# Patient Record
Sex: MALE | Race: WHITE | NOT HISPANIC OR LATINO | Employment: FULL TIME | URBAN - METROPOLITAN AREA
[De-identification: names, ages, dates, MRNs, and addresses within clinical notes are randomized per-mention and may not be internally consistent; named-entity substitution may affect disease eponyms.]

---

## 2017-01-09 ENCOUNTER — GENERIC CONVERSION - ENCOUNTER (OUTPATIENT)
Dept: OTHER | Facility: OTHER | Age: 47
End: 2017-01-09

## 2017-02-10 ENCOUNTER — GENERIC CONVERSION - ENCOUNTER (OUTPATIENT)
Dept: OTHER | Facility: OTHER | Age: 47
End: 2017-02-10

## 2017-03-01 ENCOUNTER — ALLSCRIPTS OFFICE VISIT (OUTPATIENT)
Dept: OTHER | Facility: OTHER | Age: 47
End: 2017-03-01

## 2017-07-06 ENCOUNTER — ALLSCRIPTS OFFICE VISIT (OUTPATIENT)
Dept: OTHER | Facility: OTHER | Age: 47
End: 2017-07-06

## 2018-01-13 VITALS
WEIGHT: 186 LBS | TEMPERATURE: 98 F | RESPIRATION RATE: 16 BRPM | HEIGHT: 69 IN | HEART RATE: 72 BPM | DIASTOLIC BLOOD PRESSURE: 80 MMHG | BODY MASS INDEX: 27.55 KG/M2 | SYSTOLIC BLOOD PRESSURE: 120 MMHG

## 2018-01-14 VITALS
BODY MASS INDEX: 28.44 KG/M2 | HEIGHT: 69 IN | HEART RATE: 74 BPM | SYSTOLIC BLOOD PRESSURE: 110 MMHG | TEMPERATURE: 98.4 F | WEIGHT: 192 LBS | DIASTOLIC BLOOD PRESSURE: 70 MMHG | RESPIRATION RATE: 16 BRPM

## 2018-03-09 ENCOUNTER — OFFICE VISIT (OUTPATIENT)
Dept: FAMILY MEDICINE CLINIC | Facility: CLINIC | Age: 48
End: 2018-03-09
Payer: COMMERCIAL

## 2018-03-09 VITALS
HEART RATE: 72 BPM | HEIGHT: 69 IN | RESPIRATION RATE: 16 BRPM | BODY MASS INDEX: 28.44 KG/M2 | WEIGHT: 192 LBS | SYSTOLIC BLOOD PRESSURE: 125 MMHG | TEMPERATURE: 98.2 F | DIASTOLIC BLOOD PRESSURE: 80 MMHG

## 2018-03-09 DIAGNOSIS — R20.8 RECTAL BURNING: Primary | ICD-10-CM

## 2018-03-09 DIAGNOSIS — Z00.00 PHYSICAL EXAM: ICD-10-CM

## 2018-03-09 DIAGNOSIS — K62.5 BRBPR (BRIGHT RED BLOOD PER RECTUM): ICD-10-CM

## 2018-03-09 PROCEDURE — 99214 OFFICE O/P EST MOD 30 MIN: CPT | Performed by: FAMILY MEDICINE

## 2018-03-09 NOTE — PROGRESS NOTES
Chief Complaint   Patient presents with    GI Problem     x several weeks        Patient ID: Capo Bullock is a 52 y o  male  HPI  Pt is seeing for "rectal burning pain with defecation" x few wks -  Had similar problems only occasionally for many years  -  Never had colonoscopy -  Noticed red blood when wiping himself x 2 over last wk, no prior h/o constipation     The following portions of the patient's history were reviewed and updated as appropriate: allergies, current medications, past family history, past medical history, past social history, past surgical history and problem list     Review of Systems   Constitutional: Negative  Respiratory: Negative  Cardiovascular: Negative  Gastrointestinal: Positive for rectal pain  Negative for abdominal distention, abdominal pain, blood in stool, constipation, diarrhea, nausea and vomiting  Genitourinary: Negative  Musculoskeletal: Negative  Skin: Negative  Neurological: Negative  No current outpatient prescriptions on file  No current facility-administered medications for this visit  Objective:    /80 (BP Location: Left arm, Patient Position: Sitting, Cuff Size: Standard)   Pulse 72   Temp 98 2 °F (36 8 °C) (Tympanic)   Resp 16   Ht 5' 9" (1 753 m)   Wt 87 1 kg (192 lb)   BMI 28 35 kg/m²        Physical Exam   Constitutional: He is oriented to person, place, and time  He appears well-nourished  No distress  Cardiovascular: Normal rate and regular rhythm  No murmur heard  Abdominal: Soft  There is no tenderness  Neurological: He is oriented to person, place, and time  Assessment/Plan:         Diagnoses and all orders for this visit:    Rectal burning  -     Ambulatory referral to Gastroenterology; Future    BRBPR (bright red blood per rectum)  -     Ambulatory referral to Gastroenterology; Future  -     CBC; Future  -     Comprehensive metabolic panel;  Future  -     CBC  -     Comprehensive metabolic panel    Physical exam  -     Lipid panel;  Future  -     Lipid panel        rto prn                     Elisabeth Lopez MD

## 2018-03-21 ENCOUNTER — OFFICE VISIT (OUTPATIENT)
Dept: GASTROENTEROLOGY | Facility: CLINIC | Age: 48
End: 2018-03-21
Payer: COMMERCIAL

## 2018-03-21 VITALS
HEIGHT: 69 IN | WEIGHT: 182 LBS | RESPIRATION RATE: 14 BRPM | DIASTOLIC BLOOD PRESSURE: 72 MMHG | HEART RATE: 73 BPM | BODY MASS INDEX: 26.96 KG/M2 | SYSTOLIC BLOOD PRESSURE: 118 MMHG

## 2018-03-21 DIAGNOSIS — R19.8 CHANGE IN BOWEL MOVEMENT: ICD-10-CM

## 2018-03-21 DIAGNOSIS — R20.8 RECTAL BURNING: ICD-10-CM

## 2018-03-21 DIAGNOSIS — K62.5 BRBPR (BRIGHT RED BLOOD PER RECTUM): Primary | ICD-10-CM

## 2018-03-21 PROCEDURE — 99244 OFF/OP CNSLTJ NEW/EST MOD 40: CPT | Performed by: INTERNAL MEDICINE

## 2018-03-21 NOTE — PROGRESS NOTES
Consultation - [facility]  Miko Hatfield 52 y o  male MRN: [id number]          Assessment & Plan:    Pleasant 80-year-old gentleman with recent change in bowel movements and rectal bleeding  1   Recent change in bowel movements and rectal bleeding:  Most likely constipation mediated, sounds like bleeding is likely hemorrhoidal   Of course we need to exclude underlying inflammatory bowel disease and malignancies and polyps  -we will prescribe topical hydrocortisone cream for hemorrhoidal treatment  -recommended high-fiber diet increasing fluid intake  -we will proceed with colonoscopy to exclude luminal process is  -discussed with the patient the risks of the procedures including bleeding, surgery, perforation, missed polyp detection rate      _____________________________________________________________        CC:   Rectal bleedin and change in bowel movements    HPI:  Miko Hatfield is a 52 y o male who was referred for evaluation of rectal bleeding change in bowel movements  As you know this is a very pleasant and healthy 80-year-old gentleman who is had some orthopedic surgeries in the past, normal reports having very regular bowel movements have a over the past 1 month he has had some change in bowel movements with increasing constipation  He has had some hard stools with some straining with lower abdominal bloating  He notes that on several occasions he had some bright red blood per rectum, initially just around the stool but then a large amount of red blood per rectum in the toilet water  In the past he has had some perianal itching and burning with some small amount of red blood white wiping after bowel movements  He denies any family history of GI or associated malignancies  Denies any nausea, vomiting, dysphagia, heartburn symptoms  His weight has been stable  Review of systems he does note some frequent headaches    Also notable having some night sweats which is more recent over the past 1 month  Patient denies any recent travel, change in medications, change in diet or activity  He denies any tobacco, he works for an [employer]  ROS:  The remainder of the ROS was negative except for the pertinent positives mentioned in HPI  Allergies: Nabumetone    Medications:   Current Outpatient Prescriptions:     hydrocortisone-pramoxine (ANALPRAM-HC) 2 5-1 % rectal cream, Insert into the rectum 3 (three) times a day, Disp: 30 g, Rfl: 0    Na Sulfate-K Sulfate-Mg Sulf (SUPREP BOWEL PREP KIT) 17 5-3 13-1 6 GM/180ML SOLN, Take 1 Bottle by mouth once for 1 dose, Disp: 1 Bottle, Rfl: 0'    No past medical history on file  Past Surgical History:   Procedure Laterality Date    APPENDECTOMY      HYDROCELE EXCISION / REPAIR         Family History   Problem Relation Age of Onset    Breast cancer Mother     Diabetes Father     Hypertension Father         reports that he has never smoked  He has never used smokeless tobacco           Physical Exam:     There were no vitals taken for this visit      Gen: wn/wd, NAD  HEENT: anicteric, MMM, no cervical LAD  CVS: RRR, no m/r/g  CHEST: CTA b/l  ABD: +BS, soft, NT,ND, no hepatosplenomegaly, no abdominal masses or tenderness on exam  EXT: no c/c/e  NEURO: aaox3  SKIN: NO rashes

## 2018-03-21 NOTE — LETTER
March 21, 2018     Minor [place], [address] [facility]    Patient: April Walters   YOB: 1970   Date of Visit: 3/21/2018       Dear Dr Ginger Johnson: Thank you for referring Iola Cockayne to me for evaluation  Below are my notes for this consultation  If you have questions, please do not hesitate to call me  I look forward to following your patient along with you  Sincerely,        Elmira Salcido MD        CC: No Recipients  Elmira Salcido MD  3/21/2018  8:32 AM  Sign at close encounter  Consultation - [address] [facility]  April Walters 52 y o  male MRN: [id number]          Assessment & Plan:    Pleasant 51-year-old gentleman with recent change in bowel movements and rectal bleeding  1   Recent change in bowel movements and rectal bleeding:  Most likely constipation mediated, sounds like bleeding is likely hemorrhoidal   Of course we need to exclude underlying inflammatory bowel disease and malignancies and polyps  -we will prescribe topical hydrocortisone cream for hemorrhoidal treatment  -recommended high-fiber diet increasing fluid intake  -we will proceed with colonoscopy to exclude luminal process is  -discussed with the patient the risks of the procedures including bleeding, surgery, perforation, missed polyp detection rate      _____________________________________________________________        CC:   Rectal bleedin and change in bowel movements    HPI:  April Walters is a 52 y o male who was referred for evaluation of rectal bleeding change in bowel movements  As you know this is a very pleasant and healthy 51-year-old gentleman who is had some orthopedic surgeries in the past, normal reports having very regular bowel movements have a over the past 1 month he has had some change in bowel movements with increasing constipation  He has had some hard stools with some straining with lower abdominal bloating    He notes that on several occasions he had some bright red blood per rectum, initially just around the stool but then a large amount of red blood per rectum in the toilet water  In the past he has had some perianal itching and burning with some small amount of red blood white wiping after bowel movements  He denies any family history of GI or associated malignancies  Denies any nausea, vomiting, dysphagia, heartburn symptoms  His weight has been stable  Review of systems he does note some frequent headaches  Also notable having some night sweats which is more recent over the past 1 month  Patient denies any recent travel, change in medications, change in diet or activity  He denies any tobacco, he works for an [employer]  ROS:  The remainder of the ROS was negative except for the pertinent positives mentioned in HPI  Allergies: Nabumetone    Medications:   Current Outpatient Prescriptions:     hydrocortisone-pramoxine (ANALPRAM-HC) 2 5-1 % rectal cream, Insert into the rectum 3 (three) times a day, Disp: 30 g, Rfl: 0    Na Sulfate-K Sulfate-Mg Sulf (SUPREP BOWEL PREP KIT) 17 5-3 13-1 6 GM/180ML SOLN, Take 1 Bottle by mouth once for 1 dose, Disp: 1 Bottle, Rfl: 0'    No past medical history on file  Past Surgical History:   Procedure Laterality Date    APPENDECTOMY      HYDROCELE EXCISION / REPAIR         Family History   Problem Relation Age of Onset    Breast cancer Mother     Diabetes Father     Hypertension Father         reports that he has never smoked  He has never used smokeless tobacco           Physical Exam:     There were no vitals taken for this visit      Gen: wn/wd, NAD  HEENT: anicteric, MMM, no cervical LAD  CVS: RRR, no m/r/g  CHEST: CTA b/l  ABD: +BS, soft, NT,ND, no hepatosplenomegaly, no abdominal masses or tenderness on exam  EXT: no c/c/e  NEURO: aaox3  SKIN: NO rashes

## 2018-03-30 LAB
ALBUMIN SERPL-MCNC: 4.2 G/DL (ref 3.6–5.1)
ALBUMIN/GLOB SERPL: 2 (CALC) (ref 1–2.5)
ALP SERPL-CCNC: 68 U/L (ref 40–115)
ALT SERPL-CCNC: 17 U/L (ref 9–46)
AST SERPL-CCNC: 18 U/L (ref 10–40)
BILIRUB SERPL-MCNC: 1.1 MG/DL (ref 0.2–1.2)
BUN SERPL-MCNC: 18 MG/DL (ref 7–25)
BUN/CREAT SERPL: ABNORMAL (CALC) (ref 6–22)
CALCIUM SERPL-MCNC: 9.3 MG/DL (ref 8.6–10.3)
CHLORIDE SERPL-SCNC: 106 MMOL/L (ref 98–110)
CHOLEST SERPL-MCNC: 174 MG/DL
CHOLEST/HDLC SERPL: 3.6 (CALC)
CO2 SERPL-SCNC: 33 MMOL/L (ref 20–31)
CREAT SERPL-MCNC: 0.97 MG/DL (ref 0.6–1.35)
ERYTHROCYTE [DISTWIDTH] IN BLOOD BY AUTOMATED COUNT: 12.9 % (ref 11–15)
GLOBULIN SER CALC-MCNC: 2.1 G/DL (CALC) (ref 1.9–3.7)
GLUCOSE SERPL-MCNC: 97 MG/DL (ref 65–99)
HCT VFR BLD AUTO: 46.7 % (ref 38.5–50)
HDLC SERPL-MCNC: 48 MG/DL
HGB BLD-MCNC: 15.6 G/DL (ref 13.2–17.1)
LDLC SERPL CALC-MCNC: 106 MG/DL (CALC)
MCH RBC QN AUTO: 31 PG (ref 27–33)
MCHC RBC AUTO-ENTMCNC: 33.4 G/DL (ref 32–36)
MCV RBC AUTO: 92.7 FL (ref 80–100)
NONHDLC SERPL-MCNC: 126 MG/DL (CALC)
PLATELET # BLD AUTO: 193 THOUSAND/UL (ref 140–400)
PMV BLD REES-ECKER: 9.9 FL (ref 7.5–12.5)
POTASSIUM SERPL-SCNC: 4.6 MMOL/L (ref 3.5–5.3)
PROT SERPL-MCNC: 6.3 G/DL (ref 6.1–8.1)
RBC # BLD AUTO: 5.04 MILLION/UL (ref 4.2–5.8)
SL AMB EGFR AFRICAN AMERICAN: 107 ML/MIN/1.73M2
SL AMB EGFR NON AFRICAN AMERICAN: 93 ML/MIN/1.73M2
SODIUM SERPL-SCNC: 143 MMOL/L (ref 135–146)
TRIGL SERPL-MCNC: 105 MG/DL
WBC # BLD AUTO: 5.5 THOUSAND/UL (ref 3.8–10.8)

## 2018-04-09 ENCOUNTER — HOSPITAL ENCOUNTER (OUTPATIENT)
Facility: AMBULARY SURGERY CENTER | Age: 48
Setting detail: OUTPATIENT SURGERY
Discharge: HOME | End: 2018-04-09
Attending: INTERNAL MEDICINE | Admitting: INTERNAL MEDICINE
Payer: COMMERCIAL

## 2018-04-09 ENCOUNTER — ANESTHESIA (OUTPATIENT)
Dept: GASTROENTEROLOGY | Facility: AMBULARY SURGERY CENTER | Age: 48
End: 2018-04-09
Payer: COMMERCIAL

## 2018-04-09 VITALS
SYSTOLIC BLOOD PRESSURE: 108 MMHG | OXYGEN SATURATION: 99 % | RESPIRATION RATE: 18 BRPM | DIASTOLIC BLOOD PRESSURE: 73 MMHG | TEMPERATURE: 97 F | HEART RATE: 62 BPM

## 2018-04-09 DIAGNOSIS — K62.5 BRBPR (BRIGHT RED BLOOD PER RECTUM): Primary | ICD-10-CM

## 2018-04-09 DIAGNOSIS — R20.8 RECTAL BURNING: ICD-10-CM

## 2018-04-09 PROCEDURE — 45378 DIAGNOSTIC COLONOSCOPY: CPT | Performed by: INTERNAL MEDICINE

## 2018-04-09 RX ORDER — SODIUM CHLORIDE 9 MG/ML
75 INJECTION, SOLUTION INTRAVENOUS CONTINUOUS
Status: DISCONTINUED | OUTPATIENT
Start: 2018-04-09 | End: 2018-04-09 | Stop reason: HOSPADM

## 2018-04-09 RX ORDER — PROPOFOL 10 MG/ML
INJECTION, EMULSION INTRAVENOUS AS NEEDED
Status: DISCONTINUED | OUTPATIENT
Start: 2018-04-09 | End: 2018-04-09 | Stop reason: SURG

## 2018-04-09 RX ORDER — LIDOCAINE HYDROCHLORIDE 10 MG/ML
INJECTION, SOLUTION INFILTRATION; PERINEURAL AS NEEDED
Status: DISCONTINUED | OUTPATIENT
Start: 2018-04-09 | End: 2018-04-09 | Stop reason: SURG

## 2018-04-09 RX ORDER — PROPOFOL 10 MG/ML
INJECTION, EMULSION INTRAVENOUS CONTINUOUS PRN
Status: DISCONTINUED | OUTPATIENT
Start: 2018-04-09 | End: 2018-04-09 | Stop reason: SURG

## 2018-04-09 RX ADMIN — SODIUM CHLORIDE: 0.9 INJECTION, SOLUTION INTRAVENOUS at 13:37

## 2018-04-09 RX ADMIN — PROPOFOL 100 MCG/KG/MIN: 10 INJECTION, EMULSION INTRAVENOUS at 13:40

## 2018-04-09 RX ADMIN — LIDOCAINE HYDROCHLORIDE 50 MG: 10 INJECTION, SOLUTION INFILTRATION; PERINEURAL at 13:40

## 2018-04-09 RX ADMIN — PROPOFOL 70 MG: 10 INJECTION, EMULSION INTRAVENOUS at 13:40

## 2018-04-09 NOTE — ANESTHESIA PREPROCEDURE EVALUATION
Review of Systems/Medical History      No history of anesthetic complications     Cardiovascular  Negative cardio ROS Exercise tolerance: good,     Pulmonary  Negative pulmonary ROS        GI/Hepatic    Bowel prep            Endo/Other     GYN       Hematology   Musculoskeletal       Neurology   Psychology           Physical Exam    Airway    Mallampati score: I  TM Distance: >3 FB  Neck ROM: full     Dental       Cardiovascular  Comment: Negative ROS, Cardiovascular exam normal    Pulmonary  Pulmonary exam normal     Other Findings        Anesthesia Plan  ASA Score- 1     Anesthesia Type- IV sedation with anesthesia with ASA Monitors  Additional Monitors:   Airway Plan:         Plan Factors-    Induction- intravenous  Postoperative Plan-     Informed Consent- Anesthetic plan and risks discussed with patient

## 2018-04-09 NOTE — H&P
History and Physical -  [facility]  Krista Briceno 52 y o  male MRN: [id number]    HPI: Krista Briceno is a 52y o  year old male who presents with rectal bleeding and change in bowel movements      Review of Systems    Historical Information   History reviewed  No pertinent past medical history  Past Surgical History:   Procedure Laterality Date    APPENDECTOMY      CERVICAL FUSION N/A     HYDROCELE EXCISION / REPAIR      ROTATOR CUFF REPAIR Right     SHOULDER SURGERY Left      Social History   History   Alcohol Use No     History   Drug Use No     History   Smoking Status    Never Smoker   Smokeless Tobacco    Never Used     Family History   Problem Relation Age of Onset   Marge Birch Breast cancer Mother     Diabetes Father     Hypertension Father        Meds/Allergies     Prescriptions Prior to Admission   Medication    hydrocortisone-pramoxine (ANALPRAM-HC) 2 5-1 % rectal cream    Na Sulfate-K Sulfate-Mg Sulf (SUPREP BOWEL PREP KIT) 17 5-3 13-1 6 GM/180ML SOLN       Allergies   Allergen Reactions    Nabumetone      Reaction Date: 08Apr2009;        Objective     There were no vitals taken for this visit        PHYSICAL EXAM    Gen: NAD  CV: RRR  CHEST: Clear  ABD: soft, NT/ND  EXT: no edema  Neuro: AAO      ASSESSMENT/PLAN:  This is a 52y o  year old male here for bleeding and change in bowel movements    PLAN:   Procedure: colonoscopy

## 2018-04-09 NOTE — DISCHARGE INSTRUCTIONS
Discharge home  Resume regular diet  Resume home medications  Take 1 tbsp of Metamucil daily  Will give her prescription for suppositories to take as needed  Follow-up in the office as needed  Call with any abdominal pain, bleeding, fevers  Repeat colonoscopy in 10 years, sooner if needed

## 2018-04-09 NOTE — OP NOTE
Colonoscopy Procedure Note    Procedure: Colonoscopy    Sedation: Monitored anesthesia care, check anesthesia records      ASA Class: 1    INDICATIONS: Rectal Bleeding    POST-OP DIAGNOSIS: See the impression below    Procedure Details     Prior colonoscopy: No prior colonoscopy  Informed consent was obtained for the procedure, including sedation  Risks of perforation, hemorrhage, adverse drug reaction and aspiration were discussed  The patient was placed in the left lateral decubitus position  Based on the pre-procedure assessment, including review of the patient's medical history, medications, allergies, and review of systems, he had been deemed to be an appropriate candidate for conscious sedation; he was therefore sedated with the medications listed below  The patient was monitored continuously with telemetry, pulse oximetry, blood pressure monitoring, and direct observations  A rectal examination was performed  The variable-stiffness pediatric colonoscope was inserted into the rectum and advanced under direct vision to the terminal ileum  The quality of the colonic preparation was good  A careful inspection was made as the colonoscope was withdrawn, including a retroflexed view of the rectum; findings and interventions are described below  Findings:    Normal terminal ileum  Otherwise normal colonoscopy with very mild sigmoid diverticulosis  Mild to moderate internal hemorrhoids on retroflexion           Complications:  None; patient tolerated the procedure well  Impression:    Rectal bleeding due to internal hemorrhoids  Otherwise normal colonoscopy with good prep  Mild sigmoid diverticulosis     Recommendations:  Repeat colonoscopy in 10 years or sooner if clinically indicated        Discharge home  Resume regular diet  Resume home medications  Take 1 tbsp of Metamucil daily  Will give her prescription for suppositories to take as needed  Follow-up in the office as needed  Call with any abdominal pain, bleeding, fevers  Repeat colonoscopy in 10 years, sooner if needed

## 2018-08-24 ENCOUNTER — OFFICE VISIT (OUTPATIENT)
Dept: FAMILY MEDICINE CLINIC | Facility: CLINIC | Age: 48
End: 2018-08-24
Payer: COMMERCIAL

## 2018-08-24 VITALS
SYSTOLIC BLOOD PRESSURE: 110 MMHG | BODY MASS INDEX: 28.58 KG/M2 | TEMPERATURE: 98.6 F | RESPIRATION RATE: 12 BRPM | HEART RATE: 64 BPM | DIASTOLIC BLOOD PRESSURE: 70 MMHG | HEIGHT: 69 IN | WEIGHT: 193 LBS

## 2018-08-24 DIAGNOSIS — R51.9 NONINTRACTABLE HEADACHE, UNSPECIFIED CHRONICITY PATTERN, UNSPECIFIED HEADACHE TYPE: ICD-10-CM

## 2018-08-24 DIAGNOSIS — G43.809 VESTIBULAR MIGRAINE: ICD-10-CM

## 2018-08-24 DIAGNOSIS — R42 VERTIGO: Primary | ICD-10-CM

## 2018-08-24 DIAGNOSIS — M54.2 NECK PAIN: ICD-10-CM

## 2018-08-24 PROCEDURE — 99214 OFFICE O/P EST MOD 30 MIN: CPT | Performed by: FAMILY MEDICINE

## 2018-08-24 RX ORDER — CYCLOBENZAPRINE HCL 10 MG
10 TABLET ORAL
Qty: 30 TABLET | Refills: 0 | Status: SHIPPED | OUTPATIENT
Start: 2018-08-24 | End: 2018-09-26

## 2018-08-24 RX ORDER — SUMATRIPTAN 50 MG/1
50 TABLET, FILM COATED ORAL ONCE AS NEEDED
COMMUNITY
End: 2018-08-24 | Stop reason: ALTCHOICE

## 2018-08-24 RX ORDER — SUMATRIPTAN 25 MG/1
25 TABLET, FILM COATED ORAL ONCE AS NEEDED
COMMUNITY
End: 2018-08-24 | Stop reason: CLARIF

## 2018-08-24 RX ORDER — KETOROLAC TROMETHAMINE 10 MG/1
10 TABLET, FILM COATED ORAL EVERY 6 HOURS PRN
Qty: 20 TABLET | Refills: 0 | Status: SHIPPED | OUTPATIENT
Start: 2018-08-24 | End: 2018-09-26

## 2018-08-24 NOTE — PROGRESS NOTES
Chief Complaint   Patient presents with    Headache     few weeks on and off, constant since last weekend after bending down     Dizziness        Patient ID: Jl Avila is a 50 y o  male  HPI  Pt is seeing for vertigo symptoms with position change started 1 wk ago, had R sided upper neck pin with HA -  Prior h/o neck surgery  -  Was Tx Imitrex in the past for HA -  Tried this time with no help, no other neurological symptoms, normal MRI head 2 y ago by neurologist     The following portions of the patient's history were reviewed and updated as appropriate: allergies, current medications, past family history, past medical history, past social history, past surgical history and problem list     Review of Systems   Constitutional: Negative  Respiratory: Negative  Cardiovascular: Negative  Gastrointestinal: Negative  Genitourinary: Negative  Musculoskeletal: Positive for neck pain and neck stiffness  Skin: Negative  Neurological: Positive for headaches  Negative for dizziness, syncope, weakness, light-headedness and numbness  Psychiatric/Behavioral: Negative  Current Outpatient Prescriptions   Medication Sig Dispense Refill    SUMAtriptan (IMITREX) 50 mg tablet Take 50 mg by mouth once as needed for migraine PER ALL SCRIPTS, take one tablet for migraine relief and may repeat in two hours    200mg max      cyclobenzaprine (FLEXERIL) 10 mg tablet Take 1 tablet (10 mg total) by mouth daily at bedtime 30 tablet 0    hydrocortisone (ANUSOL-HC, PROCTOSOL HC,) 2 5 % rectal cream Insert into the rectum daily at bedtime (Patient not taking: Reported on 8/24/2018 ) 30 g 3    hydrocortisone-pramoxine (ANALPRAM-HC) 2 5-1 % rectal cream Insert into the rectum 3 (three) times a day (Patient not taking: Reported on 8/24/2018 ) 30 g 0    Na Sulfate-K Sulfate-Mg Sulf (SUPREP BOWEL PREP KIT) 17 5-3 13-1 6 GM/180ML SOLN Take 1 Bottle by mouth once for 1 dose 1 Bottle 0     No current facility-administered medications for this visit  Objective:    /70 (BP Location: Left arm, Patient Position: Sitting, Cuff Size: Adult)   Pulse 64   Temp 98 6 °F (37 °C) (Temporal)   Resp 12   Ht 5' 9" (1 753 m)   Wt 87 5 kg (193 lb)   BMI 28 50 kg/m²        Physical Exam   Constitutional: He is oriented to person, place, and time  He appears well-nourished  No distress  Eyes: EOM are normal    Neck: Normal range of motion  Neck supple  Musculoskeletal: He exhibits no edema  Neurological: He is oriented to person, place, and time  No cranial nerve deficit  He exhibits normal muscle tone  Skin: No pallor  Psychiatric: He has a normal mood and affect  Assessment/Plan:         Diagnoses and all orders for this visit:    Vertigo    Nonintractable headache, unspecified chronicity pattern, unspecified headache type  -     ketorolac (TORADOL) 10 mg tablet; Take 1 tablet (10 mg total) by mouth every 6 (six) hours as needed for moderate pain  -     Ambulatory referral to Neurology; Future    Vestibular migraine    Neck pain  -     cyclobenzaprine (FLEXERIL) 10 mg tablet;  Take 1 tablet (10 mg total) by mouth daily at bedtime          rto prn                       Kanika Mast MD

## 2018-09-19 ENCOUNTER — TELEPHONE (OUTPATIENT)
Dept: FAMILY MEDICINE CLINIC | Facility: CLINIC | Age: 48
End: 2018-09-19

## 2018-09-19 NOTE — TELEPHONE ENCOUNTER
Dr Joe Banerjee    Patient is requesting refill on xanax meds sent to [facility]

## 2018-09-26 ENCOUNTER — OFFICE VISIT (OUTPATIENT)
Dept: FAMILY MEDICINE CLINIC | Facility: CLINIC | Age: 48
End: 2018-09-26
Payer: COMMERCIAL

## 2018-09-26 VITALS
RESPIRATION RATE: 16 BRPM | TEMPERATURE: 97.9 F | WEIGHT: 183 LBS | SYSTOLIC BLOOD PRESSURE: 110 MMHG | BODY MASS INDEX: 27.11 KG/M2 | DIASTOLIC BLOOD PRESSURE: 70 MMHG | HEIGHT: 69 IN | HEART RATE: 80 BPM

## 2018-09-26 DIAGNOSIS — F41.9 ANXIETY: Primary | ICD-10-CM

## 2018-09-26 PROCEDURE — 99214 OFFICE O/P EST MOD 30 MIN: CPT | Performed by: FAMILY MEDICINE

## 2018-09-26 PROCEDURE — 3008F BODY MASS INDEX DOCD: CPT | Performed by: FAMILY MEDICINE

## 2018-09-26 RX ORDER — BUSPIRONE HYDROCHLORIDE 10 MG/1
10 TABLET ORAL 2 TIMES DAILY
Qty: 60 TABLET | Refills: 0 | Status: SHIPPED | OUTPATIENT
Start: 2018-09-26 | End: 2018-10-30 | Stop reason: SDUPTHER

## 2018-09-26 RX ORDER — ALPRAZOLAM 0.25 MG/1
0.25 TABLET ORAL
Qty: 30 TABLET | Refills: 0 | Status: SHIPPED | OUTPATIENT
Start: 2018-09-26 | End: 2019-06-18 | Stop reason: SDUPTHER

## 2018-09-26 NOTE — PROGRESS NOTES
Chief Complaint   Patient presents with    Follow-up   anxiety      Patient ID: Dio Cisneros is a 50 y o  male  HPI  Pt is seeing for worsening anxiety and stress at home and at home -  Was on PRN Xanax 25 m ago -  Using only as needed  -  Was offered SSRI in the past -  Declined  -  No depressive symptoms, no SI     The following portions of the patient's history were reviewed and updated as appropriate: allergies, current medications, past family history, past medical history, past social history, past surgical history and problem list     Review of Systems   Constitutional: Negative  Respiratory: Negative  Cardiovascular: Negative  Gastrointestinal: Negative  Genitourinary: Negative  Musculoskeletal: Positive for neck pain and neck stiffness  Psychiatric/Behavioral: Positive for sleep disturbance  Negative for behavioral problems, dysphoric mood and suicidal ideas  The patient is nervous/anxious  Current Outpatient Prescriptions   Medication Sig Dispense Refill    ALPRAZolam (XANAX) 0 25 mg tablet Take 1 tablet (0 25 mg total) by mouth daily at bedtime as needed for anxiety 30 tablet 0    busPIRone (BUSPAR) 10 mg tablet Take 1 tablet (10 mg total) by mouth 2 (two) times a day 60 tablet 0    Na Sulfate-K Sulfate-Mg Sulf (SUPREP BOWEL PREP KIT) 17 5-3 13-1 6 GM/180ML SOLN Take 1 Bottle by mouth once for 1 dose 1 Bottle 0     No current facility-administered medications for this visit  Objective:    /70 (BP Location: Left arm, Patient Position: Sitting, Cuff Size: Large)   Pulse 80   Temp 97 9 °F (36 6 °C) (Tympanic)   Resp 16   Ht 5' 9" (1 753 m)   Wt 83 kg (183 lb)   BMI 27 02 kg/m²        Physical Exam   Constitutional: He appears well-nourished  No distress  Cardiovascular: Normal rate and regular rhythm  Neurological: No cranial nerve deficit  Skin: No rash noted  No pallor  Psychiatric: He has a normal mood and affect   His behavior is normal  Judgment and thought content normal                Assessment/Plan:         Diagnoses and all orders for this visit:    Anxiety  -     busPIRone (BUSPAR) 10 mg tablet; Take 1 tablet (10 mg total) by mouth 2 (two) times a day  -     ALPRAZolam (XANAX) 0 25 mg tablet;  Take 1 tablet (0 25 mg total) by mouth daily at bedtime as needed for anxiety            rto prn                 Sarina Delvalle MD

## 2018-10-22 ENCOUNTER — OFFICE VISIT (OUTPATIENT)
Dept: NEUROLOGY | Facility: CLINIC | Age: 48
End: 2018-10-22
Payer: COMMERCIAL

## 2018-10-22 VITALS
HEIGHT: 69 IN | WEIGHT: 194 LBS | BODY MASS INDEX: 28.73 KG/M2 | DIASTOLIC BLOOD PRESSURE: 68 MMHG | HEART RATE: 66 BPM | SYSTOLIC BLOOD PRESSURE: 108 MMHG

## 2018-10-22 DIAGNOSIS — G44.009 MIGRAINE-CLUSTER HEADACHE SYNDROME: Primary | ICD-10-CM

## 2018-10-22 DIAGNOSIS — R51.9 NONINTRACTABLE HEADACHE, UNSPECIFIED CHRONICITY PATTERN, UNSPECIFIED HEADACHE TYPE: ICD-10-CM

## 2018-10-22 PROCEDURE — 99244 OFF/OP CNSLTJ NEW/EST MOD 40: CPT | Performed by: PSYCHIATRY & NEUROLOGY

## 2018-10-22 RX ORDER — VERAPAMIL HYDROCHLORIDE 120 MG/1
120 CAPSULE, EXTENDED RELEASE ORAL
Qty: 30 CAPSULE | Refills: 2 | Status: SHIPPED | OUTPATIENT
Start: 2018-10-22 | End: 2019-07-08

## 2018-10-22 RX ORDER — CELECOXIB 200 MG/1
200 CAPSULE ORAL DAILY
Refills: 0 | COMMUNITY
Start: 2018-09-28 | End: 2019-01-23

## 2018-10-22 NOTE — PROGRESS NOTES
Outpatient Neurology History and Physical  Linda Maddox  [id number]  50 y o   1970          Consult: Yes    Josesito Valencia MD      Chief Complaint   Patient presents with    Headache     [place] in 2015           History Obtained from: Patient     HPI:     Mr Raheel Yung is a 49 yo M with PMH of migraines presents to establish care  He says he's had history of migraines since 2014  He was once seeing Dr Wili Padilla  He was placed on imitrex prn and topamax  He doesn't think topamax was helping  After cervical fusion for a year he was migraine free but they have returned now  For past year, he reports about 2 headaches/week  He gets vertigo as part of his aura  He describes them as behind eyes, at times either temporal region  Some are localized in occipital region  He reports associated red eyes, watery eyes  He does report cluster of headaches  In Aug/Sept he had them very frequently  Last 2-3 weeks he hasn't had headaches as frequently  He may get nausea associated with it  He will have associated light and noise sensitivity  Patient has had MRI brain 3+ years ago and says it was reportedly normal      His mother had breast cancer  Denies hx of migraines in family  Past Medical History:   Diagnosis Date    Anxiety     Back pain     Headache     Neck pain     Vertigo                Current Outpatient Prescriptions on File Prior to Visit   Medication Sig Dispense Refill    ALPRAZolam (XANAX) 0 25 mg tablet Take 1 tablet (0 25 mg total) by mouth daily at bedtime as needed for anxiety 30 tablet 0    busPIRone (BUSPAR) 10 mg tablet Take 1 tablet (10 mg total) by mouth 2 (two) times a day 60 tablet 0    Na Sulfate-K Sulfate-Mg Sulf (SUPREP BOWEL PREP KIT) 17 5-3 13-1 6 GM/180ML SOLN Take 1 Bottle by mouth once for 1 dose 1 Bottle 0     No current facility-administered medications on file prior to visit          Allergies   Allergen Reactions    Nabumetone      Reaction Date: 08Apr2009;          Family History   Problem Relation Age of Onset    Breast cancer Mother     Diabetes Father     Hypertension Father     No Known Problems Sister     No Known Problems Brother     No Known Problems Daughter     No Known Problems Daughter                 Past Surgical History:   Procedure Laterality Date    APPENDECTOMY      CERVICAL FUSION N/A     HYDROCELE EXCISION / REPAIR      OK COLONOSCOPY FLX DX W/COLLJ SPEC WHEN PFRMD N/A 4/9/2018    Procedure: COLONOSCOPY;  Surgeon: Lakeisha Felton MD;  Location[facility]; Service: Gastroenterology    ROTATOR CUFF REPAIR Right     SHOULDER SURGERY Left            Social History     Social History    Marital status: /Civil Union     Spouse name: N/A    Number of children: N/A    Years of education: N/A     Occupational History    Not on file       Social History Main Topics    Smoking status: Former Smoker    Smokeless tobacco: Never Used    Alcohol use No    Drug use: No    Sexual activity: Yes     Other Topics Concern    Not on file     Social History Narrative    No narrative on file       Review of Systems  Refer to positive review of systems in HPI  Constitutional- No fever  Eyes- No visual change  ENT- Hearing normal  CV- No chest pain  Resp- No Shortness of breath  GI- No diarrhea  - Bladder normal  MS- No Arthritis   Skin- No rash  Psych- No depression  Endo- No DM  Heme- No nodes    PHYSICAL EXAM:    Vitals:    10/22/18 1531   BP: 108/68   BP Location: Right arm   Patient Position: Sitting   Pulse: 66   Weight: 88 kg (194 lb)   Height: 5' 9" (1 753 m)         Appearance: No Acute Distress  Ophthalmoscopic: Disc Flat, Normal fundus  Carotid/Heart/Peripheral Vascular: No Bruits, RRR  Orientation: Awake, Alert, and Oriented x 3  Mental status:  Memory: Registation 3/3 Recall 3/3  Attention: Normal  Knowledge: Appropriate  Language: No aphasia  Speech: No dysarthria  Cranial Nerves:  2 No Visual Defect on Confrontation; Pupils round, equal, reactive to light  3,4,6 Extraocular Movements Intact; no nystagmus  5 Facial Sensation Intact  7 No facial asymmetry  8 Intact hearing  9,10 Palate symmetric, normal gag  11 Good shoulder shrug  12 Tongue Midline  Gait: Stable, No ataxia, can perform tandem walking  Coordination: No ataxia with finger to nose testing and heel to shin testing  Sensory: Intact, Symmetric to Pinprick, Light Touch, Vibration, and Joint Position  Muscle Tone: Normal  Muscle exam  Arm Right Left Leg Right Left   Deltoid 5/5 5/5 Iliopsoas 5/5 5/5   Biceps 5/5 5/5 Quads 5/5 5/5   Triceps 5/5 5/5 Hamstrings 5/5 5/5   Wrist Extension 5/5 5/5 Ankle Dorsi Flexion 5/5 5/5   Wrist Flexion 5/5 5/5 Ankle Plantar Flexion 5/5 5/5   Interossei 5/5 5/5 Ankle Eversion 5/5 5/5   APB 5/5 5/5 Ankle Inversion 5/5 5/5       Reflexes   RJ BJ TJ KJ AJ Plantars Sanchez's   Right 2+ 2+ 2+ 2+ 2+ Downgoing Not present   Left 2+ 2+ 2+ 2+ 2+ Downgoing Not present         Personal review of          None available     Assessment/Plan:     1  Migraine-cluster headache syndrome  verapamil (VERELAN PM) 120 MG 24 hr capsule    magnesium oxide (MAG-OX) 400 mg   2  Nonintractable headache, unspecified chronicity pattern, unspecified headache type  Ambulatory referral to Neurology           His clinical description does suggest more of vascular/cluster type of migraines  Will start him on verapamil for prevention   Will also add magnesium supplements to his regimen  If no improvement, will consider Aimovig  Counseling Documentation:  The patient and/or patient's family were  counseled regarding diagnostic results  Instructions for management,risk factor reductions,prognosis of disease were discussed  Patient and family were educated regarding impressions,risks and benefits of treatment options,importance of compliance with treatment  Total time of encounter: 45 min  More than 50% of time was spent in counseling and coordination of care of patient  CLARISA Sutton [facility]  [address]  [place] [address]

## 2018-10-30 DIAGNOSIS — F41.9 ANXIETY: ICD-10-CM

## 2018-10-30 RX ORDER — BUSPIRONE HYDROCHLORIDE 10 MG/1
TABLET ORAL
Qty: 60 TABLET | Refills: 6 | Status: SHIPPED | OUTPATIENT
Start: 2018-10-30 | End: 2019-07-08

## 2019-01-23 ENCOUNTER — OFFICE VISIT (OUTPATIENT)
Dept: FAMILY MEDICINE CLINIC | Facility: CLINIC | Age: 49
End: 2019-01-23
Payer: COMMERCIAL

## 2019-01-23 ENCOUNTER — OFFICE VISIT (OUTPATIENT)
Dept: NEUROLOGY | Facility: CLINIC | Age: 49
End: 2019-01-23
Payer: COMMERCIAL

## 2019-01-23 VITALS
TEMPERATURE: 97.8 F | RESPIRATION RATE: 18 BRPM | SYSTOLIC BLOOD PRESSURE: 110 MMHG | DIASTOLIC BLOOD PRESSURE: 60 MMHG | BODY MASS INDEX: 29.03 KG/M2 | HEIGHT: 69 IN | WEIGHT: 196 LBS | HEART RATE: 68 BPM

## 2019-01-23 VITALS
SYSTOLIC BLOOD PRESSURE: 98 MMHG | HEART RATE: 67 BPM | RESPIRATION RATE: 18 BRPM | WEIGHT: 199 LBS | BODY MASS INDEX: 29.47 KG/M2 | DIASTOLIC BLOOD PRESSURE: 60 MMHG | HEIGHT: 69 IN

## 2019-01-23 DIAGNOSIS — J34.9 SINUS DISORDER: Primary | ICD-10-CM

## 2019-01-23 DIAGNOSIS — G44.019 EPISODIC CLUSTER HEADACHE, NOT INTRACTABLE: Primary | ICD-10-CM

## 2019-01-23 PROCEDURE — 99214 OFFICE O/P EST MOD 30 MIN: CPT | Performed by: PSYCHIATRY & NEUROLOGY

## 2019-01-23 PROCEDURE — 99213 OFFICE O/P EST LOW 20 MIN: CPT | Performed by: FAMILY MEDICINE

## 2019-01-23 PROCEDURE — 3008F BODY MASS INDEX DOCD: CPT | Performed by: FAMILY MEDICINE

## 2019-01-23 PROCEDURE — 1036F TOBACCO NON-USER: CPT | Performed by: FAMILY MEDICINE

## 2019-01-23 RX ORDER — SUMATRIPTAN 100 MG/1
100 TABLET, FILM COATED ORAL ONCE AS NEEDED
Qty: 9 TABLET | Refills: 0 | Status: SHIPPED | OUTPATIENT
Start: 2019-01-23 | End: 2019-12-11

## 2019-01-23 RX ORDER — BUTALBITAL, ACETAMINOPHEN AND CAFFEINE 50; 325; 40 MG/1; MG/1; MG/1
1 TABLET ORAL AS NEEDED
Qty: 30 TABLET | Refills: 0 | Status: SHIPPED | OUTPATIENT
Start: 2019-01-23 | End: 2019-09-26

## 2019-01-23 RX ORDER — AMOXICILLIN AND CLAVULANATE POTASSIUM 875; 125 MG/1; MG/1
1 TABLET, FILM COATED ORAL EVERY 12 HOURS SCHEDULED
Qty: 14 TABLET | Refills: 0 | Status: SHIPPED | OUTPATIENT
Start: 2019-01-23 | End: 2019-01-30

## 2019-01-23 RX ORDER — KETOROLAC TROMETHAMINE 10 MG/1
10 TABLET, FILM COATED ORAL EVERY 6 HOURS PRN
Qty: 20 TABLET | Refills: 0 | Status: SHIPPED | OUTPATIENT
Start: 2019-01-23 | End: 2019-01-23

## 2019-01-23 NOTE — PROGRESS NOTES
Return NeuroOutpatient Note        Ghada Bright  [id number]  50 y o   1970       Migraine        History obtained from:  Patient     HPI/Subjective:  Mr  Adeline Elizabeth is a 51 yo M with PMH of migraines presents as follow up  Per my previous history, he says he's had history of migraines since 2014  He was once seeing Dr Brady Banks  He was placed on imitrex prn and topamax  He didn't  think topamax was helping  After cervical fusion for a year he was migraine free but  Then they returned  At last visit, he had reported 2 headaches/week  We had placed him on verapamil  He thinks overall frequency is better  He's had 2 clusters since Oct  Each cluster lasts 2-3 days         Patient has had MRI brain 3+ years ago and says it was reportedly normal           Past Medical History:   Diagnosis Date    Anxiety     Back pain     Headache     Neck pain     Vertigo      Social History     Social History    Marital status: /Civil Union     Spouse name: N/A    Number of children: N/A    Years of education: N/A     Occupational History    Not on file       Social History Main Topics    Smoking status: Former Smoker    Smokeless tobacco: Never Used    Alcohol use No    Drug use: No    Sexual activity: Yes     Other Topics Concern    Not on file     Social History Narrative    No narrative on file     Family History   Problem Relation Age of Onset   Angelica Malik Breast cancer Mother     Diabetes Father     Hypertension Father     No Known Problems Sister     No Known Problems Brother     No Known Problems Daughter     No Known Problems Daughter      No Known Allergies  Current Outpatient Prescriptions on File Prior to Visit   Medication Sig Dispense Refill    ALPRAZolam (XANAX) 0 25 mg tablet Take 1 tablet (0 25 mg total) by mouth daily at bedtime as needed for anxiety 30 tablet 0    busPIRone (BUSPAR) 10 mg tablet take 1 tablet by mouth twice a day 60 tablet 6    magnesium oxide (MAG-OX) 400 mg Take 1 tablet (400 mg total) by mouth daily 30 tablet 2    verapamil (VERELAN PM) 120 MG 24 hr capsule Take 1 capsule (120 mg total) by mouth daily after dinner 30 capsule 2    [DISCONTINUED] celecoxib (CeleBREX) 200 mg capsule Take 200 mg by mouth daily  0    Na Sulfate-K Sulfate-Mg Sulf (SUPREP BOWEL PREP KIT) 17 5-3 13-1 6 GM/180ML SOLN Take 1 Bottle by mouth once for 1 dose 1 Bottle 0     No current facility-administered medications on file prior to visit  Review of Systems   Refer to positive review of systems in HPI     Review of Systems    Constitutional- No fever  Eyes- No visual change  ENT- Hearing normal  CV- No chest pain  Resp- No Shortness of breath  GI- No diarrhea  - Bladder normal  MS- No Arthritis   Skin- No rash  Psych- No depression  Endo- No DM  Heme- No nodes    Vitals:    01/23/19 1601   BP: 98/60   BP Location: Left arm   Patient Position: Sitting   Cuff Size: Adult   Pulse: 67   Resp: 18   Weight: 90 3 kg (199 lb)   Height: 5' 9" (1 753 m)       PHYSICAL EXAM:  Appearance: No Acute Distress  Ophthalmoscopic: Disc Flat, Normal fundus  Mental status:  Orientation: Awake, Alert, and Orientedx3  Memory: Registation 3/3 Recall 3/3  Attention: normal  Knowledge: good  Language: No aphasia  Speech: No dysarthria  Cranial Nerves:  2 No Visual Defect on Confrontation, Pupils round, equal, reactive to light  3,4,6 Extraocular Movements Intact, no nystagmus  5 Facial Sensation Intact  7 No facial asymmetry  8 Intact hearing  9,10 Palate symmetric, normal gag  11 Good shoulder shrug  12 Tongue Midline  Gait: Stable  Coordination: No ataxia with finger to nose testing, and heel to shin  Sensory: Intact, Symmetric to pinprick, light touch, vibration, and joint position  Muscle Tone: Normal              Muscle exam:  Arm Right Left Leg Right Left   Deltoid 5/5 5/5 Iliopsoas 5/5 5/5   Biceps 5/5 5/5 Quads 5/5 5/5   Triceps 5/5 5/5 Hamstrings 5/5 5/5   Wrist Extension 5/5 5/5 Ankle Dorsi Flexion 5/5 5/5 Wrist Flexion 5/5 5/5 Ankle Plantar Flexion 5/5 5/5   Interossei 5/5 5/5 Ankle Eversion 5/5 5/5   APB 5/5 5/5 Ankle Inversion 5/5 5/5       Reflexes   RJ BJ TJ KJ AJ Plantars Sanchez's   Right 2+ 2+ 2+ 2+ 2+ Downgoing Not present   Left 2+ 2+ 2+ 2+ 2+ Downgoing Not present     Personal review of  Labs:                    Diagnoses and all orders for this visit:        1  Episodic cluster headache, not intractable  SUMAtriptan (IMITREX) 100 mg tablet    butalbital-acetaminophen-caffeine (FIORICET,ESGIC) -40 mg per tablet    DISCONTINUED: ketorolac (TORADOL) 10 mg tablet       At present her headache is due to sinus infection  Overall, they seem to be less frequent than previously  Will resume veapamil 120mg and needed     Will use imitrex and fioricets as needed             Total time of encounter:  30 min  More than 50% of the time was used in counseling and/or coordination of care  Extent of counseling and/or coordination of care        Jami Mcclain MD  [facility]  [address]  [place] [address]  [phone]

## 2019-01-23 NOTE — PROGRESS NOTES
Chief Complaint   Patient presents with    Sinus Problem        Patient ID: Capo Bullokc is a 50 y o  male  HPI  Pt is seeing for sinus pressure and congestion with HA x 1 wk, worsening  -  Tried OTC meds with no help  -  No other URI symptoms     The following portions of the patient's history were reviewed and updated as appropriate: allergies, current medications, past family history, past medical history, past social history, past surgical history and problem list     Review of Systems   Constitutional: Negative  HENT: Positive for congestion, postnasal drip, sinus pain and sinus pressure  Negative for ear pain, sore throat and tinnitus  Respiratory: Negative  Gastrointestinal: Negative  Current Outpatient Prescriptions   Medication Sig Dispense Refill    ALPRAZolam (XANAX) 0 25 mg tablet Take 1 tablet (0 25 mg total) by mouth daily at bedtime as needed for anxiety 30 tablet 0    busPIRone (BUSPAR) 10 mg tablet take 1 tablet by mouth twice a day 60 tablet 6    celecoxib (CeleBREX) 200 mg capsule Take 200 mg by mouth daily  0    magnesium oxide (MAG-OX) 400 mg Take 1 tablet (400 mg total) by mouth daily 30 tablet 2    verapamil (VERELAN PM) 120 MG 24 hr capsule Take 1 capsule (120 mg total) by mouth daily after dinner 30 capsule 2    Na Sulfate-K Sulfate-Mg Sulf (SUPREP BOWEL PREP KIT) 17 5-3 13-1 6 GM/180ML SOLN Take 1 Bottle by mouth once for 1 dose 1 Bottle 0     No current facility-administered medications for this visit  Objective:    /60 (BP Location: Left arm, Patient Position: Sitting, Cuff Size: Large)   Pulse 68   Temp 97 8 °F (36 6 °C) (Tympanic)   Resp 18   Ht 5' 9" (1 753 m)   Wt 88 9 kg (196 lb)   BMI 28 94 kg/m²        Physical Exam   Constitutional: No distress  HENT:   Right Ear: Tympanic membrane normal    Left Ear: Tympanic membrane normal    Nose: Right sinus exhibits maxillary sinus tenderness and frontal sinus tenderness   Left sinus exhibits maxillary sinus tenderness and frontal sinus tenderness  Mouth/Throat: No oropharyngeal exudate  Cardiovascular: Normal rate  Pulmonary/Chest: Effort normal and breath sounds normal  No respiratory distress  He has no wheezes  He has no rales  Assessment/Plan:         Diagnoses and all orders for this visit:    Sinus disorder  -     amoxicillin-clavulanate (AUGMENTIN) 875-125 mg per tablet;  Take 1 tablet by mouth every 12 (twelve) hours for 7 days          rto prn                   Helena East MD

## 2019-01-24 ENCOUNTER — TELEPHONE (OUTPATIENT)
Dept: NEUROLOGY | Facility: CLINIC | Age: 49
End: 2019-01-24

## 2019-01-24 NOTE — TELEPHONE ENCOUNTER
Gave verbal to Aristides at [facility] for [employer]- acetaminophen-caffeine -40 mg tablets  Qty 30      1 tab Daily PRN for headaches  No refills

## 2019-03-26 ENCOUNTER — TELEPHONE (OUTPATIENT)
Dept: FAMILY MEDICINE CLINIC | Facility: CLINIC | Age: 49
End: 2019-03-26

## 2019-03-28 DIAGNOSIS — T78.40XD ALLERGIC STATE, SUBSEQUENT ENCOUNTER: Primary | ICD-10-CM

## 2019-03-28 RX ORDER — MONTELUKAST SODIUM 10 MG/1
10 TABLET ORAL
Qty: 30 TABLET | Refills: 6 | Status: SHIPPED | OUTPATIENT
Start: 2019-03-28 | End: 2020-03-30

## 2019-06-18 DIAGNOSIS — F41.9 ANXIETY: ICD-10-CM

## 2019-06-18 RX ORDER — ALPRAZOLAM 0.25 MG/1
TABLET ORAL
Qty: 30 TABLET | Refills: 0 | Status: SHIPPED | OUTPATIENT
Start: 2019-06-18 | End: 2019-11-11 | Stop reason: SDUPTHER

## 2019-07-08 ENCOUNTER — OFFICE VISIT (OUTPATIENT)
Dept: FAMILY MEDICINE CLINIC | Facility: CLINIC | Age: 49
End: 2019-07-08
Payer: COMMERCIAL

## 2019-07-08 VITALS
HEIGHT: 69 IN | TEMPERATURE: 98.7 F | DIASTOLIC BLOOD PRESSURE: 70 MMHG | WEIGHT: 173.8 LBS | HEART RATE: 64 BPM | SYSTOLIC BLOOD PRESSURE: 90 MMHG | RESPIRATION RATE: 16 BRPM | BODY MASS INDEX: 25.74 KG/M2

## 2019-07-08 DIAGNOSIS — L23.7 POISON IVY: ICD-10-CM

## 2019-07-08 DIAGNOSIS — G43.809 OTHER MIGRAINE WITHOUT STATUS MIGRAINOSUS, NOT INTRACTABLE: Primary | ICD-10-CM

## 2019-07-08 PROCEDURE — 96372 THER/PROPH/DIAG INJ SC/IM: CPT | Performed by: FAMILY MEDICINE

## 2019-07-08 PROCEDURE — 1036F TOBACCO NON-USER: CPT | Performed by: FAMILY MEDICINE

## 2019-07-08 PROCEDURE — 99213 OFFICE O/P EST LOW 20 MIN: CPT | Performed by: FAMILY MEDICINE

## 2019-07-08 PROCEDURE — 3008F BODY MASS INDEX DOCD: CPT | Performed by: FAMILY MEDICINE

## 2019-07-08 RX ORDER — METHYLPREDNISOLONE SODIUM SUCCINATE 40 MG/ML
40 INJECTION, POWDER, LYOPHILIZED, FOR SOLUTION INTRAMUSCULAR; INTRAVENOUS ONCE
Status: DISCONTINUED | OUTPATIENT
Start: 2019-07-08 | End: 2019-07-08

## 2019-07-08 RX ORDER — PREDNISONE 10 MG/1
TABLET ORAL
Qty: 45 TABLET | Refills: 0 | Status: SHIPPED | OUTPATIENT
Start: 2019-07-08 | End: 2019-09-26

## 2019-07-08 RX ORDER — METHYLPREDNISOLONE SODIUM SUCCINATE 40 MG/ML
80 INJECTION, POWDER, LYOPHILIZED, FOR SOLUTION INTRAMUSCULAR; INTRAVENOUS ONCE
Status: COMPLETED | OUTPATIENT
Start: 2019-07-08 | End: 2019-07-08

## 2019-07-08 RX ADMIN — METHYLPREDNISOLONE SODIUM SUCCINATE 80 MG: 40 INJECTION, POWDER, LYOPHILIZED, FOR SOLUTION INTRAMUSCULAR; INTRAVENOUS at 11:33

## 2019-07-08 NOTE — Clinical Note
Can we please see if the patient Xanax was ever picked up at the pharmacy, patient stating he needs a refill from Dr Dalton Doss, I see it was called in on 6/18

## 2019-07-08 NOTE — PROGRESS NOTES
CALLED PHARMACIST AND SHE SAID PT NEVER P/U THE SCRIPT FROM 6/18 SO IT WAS PUT BACK, SHE WILL REFILL THAT ONE NOW, I THEN CALLED THE PT AND ADVISED SAME

## 2019-07-08 NOTE — PATIENT INSTRUCTIONS
Start oral prednisone on 1 states no relief from IM injection  Poison Ivy   WHAT YOU NEED TO KNOW:   Poison ivy is a plant that can cause an itchy, uncomfortable rash on your skin  Poison ivy grows as a shrub or vine in woods, fields, and areas of thick [place]  It has 3 bright green leaves on each stem that turn red in joni  DISCHARGE INSTRUCTIONS:   Medicines:   · Antiseptic or drying creams or ointments: These medicines may be used to dry out the rash and decrease the itching  These products may be available without a doctor's order  · Steroids: This medicine helps decrease itching and inflammation  It can be given as a cream to apply to your skin or as a pill  · Antihistamines: This medicine may help decrease itching and help you sleep  It is available without a doctor's order  · Take your medicine as directed  Contact your healthcare provider if you think your medicine is not helping or if you have side effects  Tell him of her if you are allergic to any medicine  Keep a list of the medicines, vitamins, and herbs you take  Include the amounts, and when and why you take them  Bring the list or the pill bottles to follow-up visits  Carry your medicine list with you in case of an emergency  Follow up with your healthcare provider as directed:  Write down your questions so you remember to ask them during your visits  How your poison ivy rash spreads: You cannot spread poison ivy by touching your rash or the liquid from your blisters  Poison ivy is spread only if you scratch your skin while it still has oil on it  You may think your rash is spreading because new rashes appear over a number of days  This happens because areas covered by thin skin break out in a rash first  Your face or forearms may develop a rash before thicker areas, such as the palms of your hands  Self-care:   · Keep your rash clean and dry:  Wash it with soap and water  Gently pat it dry with a clean towel      · Try not to scratch or rub your rash: This can cause your skin to become infected  · Use a compress on your rash:  Dip a clean washcloth in cool water  Wring it out and place it on your rash  Leave the washcloth on your skin for 15 minutes  Do this at least 3 times per day  · Take a cornstarch or oatmeal bath: If your rash is too large to cover with wet washcloths, take 3 or 4 cornstarch baths daily  Mix 1 pound of cornstarch with a little water to make a paste  Add the paste to a tub full of water and mix well  You may also use colloidal oatmeal in the bath water  Use lukewarm water  Avoid hot water because it may cause your itching to increase  Prevent a poison ivy rash in the future:   · Wear skin protection:  Wear long pants, a long-sleeved shirt, and gloves  Use a skin block lotion to protect your skin from poison ivy oil  You can find this at a drugstore without a prescription  · Wash clothing after possible exposure: If you think you have been near a poison ivy plant, wash the clothes you were wearing separately from other clothes  Rinse the washing machine well after you take the clothes out  Scrub boots and shoes with warm, soapy water  Dry clean items and clothing that you cannot wash in water  Poison ivy oil is sticky and can stay on surfaces for a long time  It can cause a new rash even years later  · Bathe your pet:  Use warm water and shampoo on your pet's fur  This will prevent the spread of oil to your skin, car, and home  Wear long sleeves, long pants, and gloves while washing pets or any items that may have oil on them  · Reduce exposure to poison ivy:  Do not touch plants that look like poison ivy  Keep your yard free of poison ivy  While protecting your skin, remove the plant and the roots  Place them in a plastic bag and seal the bag tightly  · Do not burn poison ivy plants: This can spread the oil through the air   If you breathe the oil into your lungs, you could have swelling and serious breathing problems  Oil that clings to the fire joey can land on your skin and cause a rash  Contact your healthcare provider if:   · You have pus, soft yellow scabs, or tenderness on the rash  · The itching gets worse or keeps you awake at night  · The rash covers more than 1/4 of your skin or spreads to your eyes, mouth, or genital area  · The rash is not better after 2 to 3 weeks  · You have tender, swollen glands on the sides of your neck  · You have swelling in your arms and legs  · You have questions or concerns about your condition or care  Return to the emergency department if:   · You have a fever  · You have redness, swelling, and tenderness around the rash  · You have trouble breathing  © 2017 [employer]  Information is for End User's use only and may not be sold, redistributed or otherwise used for commercial purposes  All illustrations and images included in CareNotes® are the copyrighted property of A [employer]  or [employer]  The above information is an  only  It is not intended as medical advice for individual conditions or treatments  Talk to your doctor, nurse or pharmacist before following any medical regimen to see if it is safe and effective for you

## 2019-07-08 NOTE — PROGRESS NOTES
Liza Lewis 1970 male MRN: [id number]    FAMILY PRACTICE OFFICE VISIT  [facility] - [address]      ASSESSMENT/PLAN  Liza Lewis is a 50 y o  male presents to the office for    1  Other migraine without status migrainosus, not intractable  Patient here for an acute appointment but wondering if we can have a Xanax refilled  He states that he called in 2 weeks ago with no her results  Patient states that he is very well controlled on Xanax as needed given his high stress of his family having Alzheimer's and a sister very ill  This helps with his cluster headaches    2  Poison ivy  Given 1 dose of IM 80 mg prednisone  Encouraged to use prednisone tablets on Wednesday if  No relief  Education given on side effects of steroids  Encourage calamine lotion  Encourage Benadryl as needed for nighttime  - predniSONE 10 mg tablet; Take 4 tablets x 5 days, then 3 tablets x 2 days, then 2 tablets x 2 days, then 1 tablets x 2 days, then 1/2 tablet x 3 days  Dispense: 45 tablet; Refill: 0  - methylPREDNISolone sodium succinate (Solu-MEDROL) injection 80 mg       Disposition: Return to the office in 1 week if symptoms worsen  Future Appointments   Date Time Provider Perico Salinas   7/8/2019 11:30 AM Chavez Thompson MD [facility]          SUBJECTIVE  CC: Poison Oak (on forearms ,legs)      HPI:  Liza Lewis is a 50 y o  male who presents for an acute appointment  On Friday the patient was exposed to poison ivy  His quickly spread to bilateral arms, eyes, chest, legs  The patient has a history of cluster headaches was recently put on verapamil which she discontinued himself  States that he has been using Xanax and Imitrex as needed with great relief  Believes his cluster headaches secondary to the stressors of the now Alzheimer parent and sister suffering from health conditions  Patient states that it he takes Xanax maybe 2 to 3 times month    Week prior at 5 cluster headaches requesting a refill on his Xanax    Review of Systems   Constitutional: Negative for activity change, appetite change, chills, fatigue and fever  HENT: Negative for congestion  Respiratory: Negative for cough, chest tightness and shortness of breath  Cardiovascular: Negative for chest pain and leg swelling  Gastrointestinal: Negative for abdominal distention, abdominal pain, constipation, diarrhea, nausea and vomiting  Skin: Positive for rash  Psychiatric/Behavioral: The patient is nervous/anxious  All other systems reviewed and are negative        Historical Information   The patient history was reviewed as follows:  Past Medical History:   Diagnosis Date    Anxiety     Back pain     Headache     Neck pain     Vertigo          Medications:     Current Outpatient Medications:     ALPRAZolam (XANAX) 0 25 mg tablet, take 1 tablet by mouth once daily at bedtime AS NEEDED FOR ANEXIETY, Disp: 30 tablet, Rfl: 0    butalbital-acetaminophen-caffeine (FIORICET,ESGIC) -40 mg per tablet, Take 1 tablet by mouth as needed for headaches, Disp: 30 tablet, Rfl: 0    magnesium oxide (MAG-OX) 400 mg, Take 1 tablet (400 mg total) by mouth daily, Disp: 30 tablet, Rfl: 2    montelukast (SINGULAIR) 10 mg tablet, Take 1 tablet (10 mg total) by mouth daily at bedtime, Disp: 30 tablet, Rfl: 6    SUMAtriptan (IMITREX) 100 mg tablet, Take 1 tablet (100 mg total) by mouth once as needed for migraine for up to 1 dose, Disp: 9 tablet, Rfl: 0    Na Sulfate-K Sulfate-Mg Sulf (SUPREP BOWEL PREP KIT) 17 5-3 13-1 6 GM/180ML SOLN, Take 1 Bottle by mouth once for 1 dose, Disp: 1 Bottle, Rfl: 0    No Known Allergies    OBJECTIVE  Vitals:   Vitals:    07/08/19 1113   BP: 90/70   BP Location: Left arm   Patient Position: Sitting   Cuff Size: Standard   Pulse: 64   Resp: 16   Temp: 98 7 °F (37 1 °C)   Weight: 78 8 kg (173 lb 12 8 oz)   Height: 5' 9" (1 753 m)         Physical Exam   Constitutional: He is oriented to person, place, and time  Vital signs are normal  He appears well-developed and well-nourished  HENT:   Head: Normocephalic and atraumatic  Eyes: Pupils are equal, round, and reactive to light  Conjunctivae and EOM are normal    Neck: Normal range of motion  Neck supple  Cardiovascular: Normal rate, regular rhythm, S1 normal, S2 normal, normal heart sounds and intact distal pulses  No murmur heard  Pulmonary/Chest: Effort normal and breath sounds normal  No respiratory distress  He has no wheezes  Musculoskeletal: Normal range of motion  He exhibits no edema  Neurological: He is alert and oriented to person, place, and time  He has normal strength  Skin: Skin is warm  Diffuse linear rash oozing blisters especially in the left arm; Found over the nasal bridge/lower extremities/upper arms   Psychiatric: He has a normal mood and affect  His speech is normal and behavior is normal  Judgment and thought content normal    Vitals reviewed                   Emilie Villa MD,   [facility]  7/8/2019

## 2019-09-26 ENCOUNTER — OFFICE VISIT (OUTPATIENT)
Dept: FAMILY MEDICINE CLINIC | Facility: CLINIC | Age: 49
End: 2019-09-26
Payer: COMMERCIAL

## 2019-09-26 VITALS
DIASTOLIC BLOOD PRESSURE: 78 MMHG | RESPIRATION RATE: 16 BRPM | SYSTOLIC BLOOD PRESSURE: 114 MMHG | BODY MASS INDEX: 26.36 KG/M2 | HEIGHT: 69 IN | WEIGHT: 178 LBS | HEART RATE: 64 BPM | TEMPERATURE: 98.2 F

## 2019-09-26 DIAGNOSIS — M54.41 CHRONIC MIDLINE LOW BACK PAIN WITH BILATERAL SCIATICA: ICD-10-CM

## 2019-09-26 DIAGNOSIS — Z23 NEED FOR VIRAL IMMUNIZATION: ICD-10-CM

## 2019-09-26 DIAGNOSIS — M54.42 CHRONIC MIDLINE LOW BACK PAIN WITH BILATERAL SCIATICA: ICD-10-CM

## 2019-09-26 DIAGNOSIS — G89.29 CHRONIC MIDLINE LOW BACK PAIN WITH BILATERAL SCIATICA: ICD-10-CM

## 2019-09-26 DIAGNOSIS — R35.1 NOCTURIA: Primary | ICD-10-CM

## 2019-09-26 PROCEDURE — 90686 IIV4 VACC NO PRSV 0.5 ML IM: CPT

## 2019-09-26 PROCEDURE — 90471 IMMUNIZATION ADMIN: CPT

## 2019-09-26 PROCEDURE — 99214 OFFICE O/P EST MOD 30 MIN: CPT

## 2019-09-26 NOTE — PROGRESS NOTES
Chief Complaint   Patient presents with    Back Pain     concerned about prostate        Patient ID: Brynn Gutierrez is a 52 y o  male  HPI  Pt is seeing for chronic low back pain with radiation to both legs and groin for several months -  Was seeing by ortho, was sent for xray -  Normal, failed traditional PT - did not follow up for 6 m -  " I was doing Internet search and now concerned about my prostate as a cause of back pain"     The following portions of the patient's history were reviewed and updated as appropriate: allergies, current medications, past family history, past medical history, past social history, past surgical history and problem list     Review of Systems   Constitutional: Negative  Respiratory: Negative  Cardiovascular: Negative  Gastrointestinal: Negative  Genitourinary: Negative  Negative for decreased urine volume, difficulty urinating, dysuria, enuresis, flank pain, frequency, hematuria, penile pain, penile swelling, scrotal swelling, testicular pain and urgency  Urinating 1-2 times at night for last 2+ y    Musculoskeletal: Positive for back pain  Negative for gait problem  Skin: Negative  Neurological: Negative  Current Outpatient Medications   Medication Sig Dispense Refill    ALPRAZolam (XANAX) 0 25 mg tablet take 1 tablet by mouth once daily at bedtime AS NEEDED FOR ANEXIETY 30 tablet 0    magnesium oxide (MAG-OX) 400 mg Take 1 tablet (400 mg total) by mouth daily 30 tablet 2    montelukast (SINGULAIR) 10 mg tablet Take 1 tablet (10 mg total) by mouth daily at bedtime 30 tablet 6    SUMAtriptan (IMITREX) 100 mg tablet Take 1 tablet (100 mg total) by mouth once as needed for migraine for up to 1 dose 9 tablet 0     No current facility-administered medications for this visit          Objective:    /78 (BP Location: Right arm, Patient Position: Sitting, Cuff Size: Standard)   Pulse 64   Temp 98 2 °F (36 8 °C) (Tympanic)   Resp 16   Ht 5' 9" (1 753 m)   Wt 80 7 kg (178 lb)   BMI 26 29 kg/m²        Physical Exam   Cardiovascular: Normal rate  Musculoskeletal: Normal range of motion  He exhibits no tenderness or deformity  Skin: No rash noted  No pallor  Assessment/Plan:         Diagnoses and all orders for this visit:    Nocturia  -     Ambulatory referral to Urology; Future    Chronic midline low back pain with bilateral sciatica  -     Ambulatory referral to Physical Therapy;  Future  Was advised to try red cord PT   Need for viral immunization  -     FLUZONE: influenza vaccine, quadrivalent, 0 5 mL      rto prn                       Sarina Delvalle MD

## 2019-09-30 ENCOUNTER — EVALUATION (OUTPATIENT)
Dept: PHYSICAL THERAPY | Facility: CLINIC | Age: 49
End: 2019-09-30
Payer: COMMERCIAL

## 2019-09-30 DIAGNOSIS — M54.41 CHRONIC MIDLINE LOW BACK PAIN WITH BILATERAL SCIATICA: ICD-10-CM

## 2019-09-30 DIAGNOSIS — G89.29 CHRONIC MIDLINE LOW BACK PAIN WITH BILATERAL SCIATICA: ICD-10-CM

## 2019-09-30 DIAGNOSIS — M54.42 CHRONIC MIDLINE LOW BACK PAIN WITH BILATERAL SCIATICA: ICD-10-CM

## 2019-09-30 PROCEDURE — 97161 PT EVAL LOW COMPLEX 20 MIN: CPT | Performed by: PHYSICAL THERAPIST

## 2019-09-30 NOTE — PROGRESS NOTES
PT Evaluation     Today's date: 2019  Patient name: Lauro Martinez  : 1970  MRN: [id number]  Referring provider: Lesai Muhammad MD  Dx:   Encounter Diagnosis     ICD-10-CM    1  Chronic midline low back pain with bilateral sciatica M54 41 Ambulatory referral to Physical Therapy    M54 42     G89 29                   Assessment  Assessment details: Ardeen Leoncioklelena Rothpresents with signs and symptoms consistent with Chronic midline low back pain with bilateral sciatica, with loss of range of motion, strength and spinal stabilization  Presents with high reactivity  Lauro Martinez would benefit with physical therapy to address these impairments to return to prior level of function  Impairments: abnormal or restricted ROM, activity intolerance, impaired physical strength, lacks appropriate home exercise program and pain with function  Understanding of Dx/Px/POC: good   Prognosis: good    Goals  STG  Initiate HEP  Able to control symptoms with HEP in 3 weeks  LTG  Independent with HEP  Able to play golf without back pain in 6 weeks      Plan  Planned therapy interventions: manual therapy, joint mobilization, neuromuscular re-education, postural training, strengthening, stretching, therapeutic exercise and functional ROM exercises  Frequency: 2x week  Duration in visits: 12  Duration in weeks: 6  Treatment plan discussed with: patient        Subjective Evaluation    History of Present Illness  Mechanism of injury: Patient reports having a fusion at [facility] -2016, has had back pain with radiating symptoms in the bilateral Rt > Lft legs  With the top of the foot with altered sensations              Recurrent probem    Quality of life: good    Pain  Current pain rating: 3  Quality: dull ache, cramping and radiating  Relieving factors: change in position  Aggravating factors: sitting and standing  Progression: worsening    Social Support  Steps to enter house: yes  Stairs in house: yes     Employment status: working  Treatments  Current treatment: physical therapy  Patient Goals  Patient goals for therapy: decreased pain, improved balance, increased motion, increased strength and independence with ADLs/IADLs          Objective     Concurrent Complaints  Negative for night pain, disturbed sleep, bladder dysfunction, bowel dysfunction, saddle (S4) numbness, history of cancer, history of trauma and infection    Active Range of Motion     Lumbar   Flexion: 50 degrees  with pain  Extension: 30 degrees  with pain  Left Hip   Flexion: 120 degrees   Abduction: 40 degrees   External rotation (90/90): 35 degrees   Internal rotation (90/90): 25 degrees     Right Hip   Flexion: 110 degrees   Abduction: 30 degrees   External rotation (90/90): 25 degrees   Internal rotation (90/90): 15 degrees     Strength/Myotome Testing     Lumbar   Left   Heel walk: normal  Toe walk: normal    Right   Heel walk: normal  Toe walk: normal    Left Hip   Planes of Motion   Flexion: 5    Right Hip   Planes of Motion   Flexion: 5    Left Knee   Flexion: 5  Extension: 5    Right Knee   Flexion: 5  Extension: 5    Left Ankle/Foot   Dorsiflexion: 5  Plantar flexion: 5    Right Ankle/Foot   Dorsiflexion: 5  Plantar flexion: 5      Flowsheet Rows      Most Recent Value   PT/OT G-Codes   Current Score  56   Projected Score  67             Precautions: standard    Manual  9/30            STM psoas perf                                                                    Exercise Diary  9/30            Knee to chest 10x            Piriformis stretch 10x            Hamstring stretch 10x            Hip Abd SDLY 10x            Clamshell 10x            Reverse clamshell 10x                                                                                                                                                                                                      Modalities                                                             Myofascial Chain Tests   Tests Left Side Right Side Comments Total   Supine Pelvic Tilt 0 d 0 d     Supine Bridging 0 d 0 d     Prone Bridging 0 d 0 d     Side-lying Hip Abduction 0 d 0 d     Side-lying Hip Adduction 0 d 0 d     Total Scores 0/15  0/15  Overall score 0/30

## 2019-10-08 ENCOUNTER — OFFICE VISIT (OUTPATIENT)
Dept: PHYSICAL THERAPY | Facility: CLINIC | Age: 49
End: 2019-10-08
Payer: COMMERCIAL

## 2019-10-08 DIAGNOSIS — M54.42 CHRONIC MIDLINE LOW BACK PAIN WITH BILATERAL SCIATICA: Primary | ICD-10-CM

## 2019-10-08 DIAGNOSIS — G89.29 CHRONIC MIDLINE LOW BACK PAIN WITH BILATERAL SCIATICA: Primary | ICD-10-CM

## 2019-10-08 DIAGNOSIS — M54.41 CHRONIC MIDLINE LOW BACK PAIN WITH BILATERAL SCIATICA: Primary | ICD-10-CM

## 2019-10-08 PROCEDURE — 97110 THERAPEUTIC EXERCISES: CPT | Performed by: PHYSICAL THERAPIST

## 2019-10-08 PROCEDURE — 97112 NEUROMUSCULAR REEDUCATION: CPT | Performed by: PHYSICAL THERAPIST

## 2019-10-08 NOTE — PROGRESS NOTES
Daily Note     Today's date: 10/8/2019  Patient name: Jennifer Yo  : 1970  MRN: [id number]  Referring provider: Oliva Humphrey MD  Dx:   Encounter Diagnosis     ICD-10-CM    1  Chronic midline low back pain with bilateral sciatica M54 41     M54 42     G89 29                   Subjective: I still have rt side stiffness  Objective: See treatment diary below      Assessment: Tolerated treatment well  Patient demonstrated fatigue post treatment and exhibited good technique with therapeutic exercises      Plan: Continue per plan of care        Precautions: standard    Manual              STM psoas perf                                                                    Exercise Diary  9/30 10/8           Knee to chest 10x            Piriformis stretch 10x            Hamstring stretch 10x            Hip Abd SDLY 10x            Clamshell 10x            Reverse clamshell 10x            Neurac supine pelvic lift  2x5           Neurac Bridge  2x5           Neurac SDLY Hip ABD  2x5           Neurac SDLY Hip ADD  2x5           Neurac prone plank  2x5           Neurac kneeling sh flex  2x5                                                                                                                       Modalities

## 2019-10-14 DIAGNOSIS — F41.9 ANXIETY: ICD-10-CM

## 2019-10-17 ENCOUNTER — OFFICE VISIT (OUTPATIENT)
Dept: PHYSICAL THERAPY | Facility: CLINIC | Age: 49
End: 2019-10-17
Payer: COMMERCIAL

## 2019-10-17 DIAGNOSIS — G89.29 CHRONIC MIDLINE LOW BACK PAIN WITH BILATERAL SCIATICA: Primary | ICD-10-CM

## 2019-10-17 DIAGNOSIS — M54.42 CHRONIC MIDLINE LOW BACK PAIN WITH BILATERAL SCIATICA: Primary | ICD-10-CM

## 2019-10-17 DIAGNOSIS — M54.41 CHRONIC MIDLINE LOW BACK PAIN WITH BILATERAL SCIATICA: Primary | ICD-10-CM

## 2019-10-17 PROCEDURE — 97110 THERAPEUTIC EXERCISES: CPT | Performed by: PHYSICAL THERAPIST

## 2019-10-17 PROCEDURE — 97112 NEUROMUSCULAR REEDUCATION: CPT | Performed by: PHYSICAL THERAPIST

## 2019-10-17 NOTE — PROGRESS NOTES
Daily Note     Today's date: 10/17/2019  Patient name: Karen Fleming  : 1970  MRN: [id number]  Referring provider: Sona De Guzman MD  Dx:   Encounter Diagnosis     ICD-10-CM    1  Chronic midline low back pain with bilateral sciatica M54 41     M54 42     G89 29                   Subjective: No overall change  Objective: See treatment diary below      Assessment: Tolerated treatment well  Patient demonstrated fatigue post treatment and exhibited good technique with therapeutic exercises      Plan: Continue per plan of care        Precautions: standard    Manual              STM psoas perf                                                                    Exercise Diary  9/30 10/8 10/17          Knee to chest 10x            Piriformis stretch 10x            Hamstring stretch 10x            Hip Abd SDLY 10x            Clamshell 10x            Reverse clamshell 10x            Neurac supine pelvic lift  2x5 2x5          Neurac Bridge  2x5 2x5          Neurac SDLY Hip ABD  2x5 2x5          Neurac SDLY Hip ADD  2x5 2x5          Neurac prone plank  2x5 2x5          Neurac kneeling sh flex  2x5 2x5                                                                                                                      Modalities

## 2019-10-22 ENCOUNTER — APPOINTMENT (OUTPATIENT)
Dept: PHYSICAL THERAPY | Facility: CLINIC | Age: 49
End: 2019-10-22
Payer: COMMERCIAL

## 2019-10-24 ENCOUNTER — OFFICE VISIT (OUTPATIENT)
Dept: PHYSICAL THERAPY | Facility: CLINIC | Age: 49
End: 2019-10-24
Payer: COMMERCIAL

## 2019-10-24 DIAGNOSIS — M54.42 CHRONIC MIDLINE LOW BACK PAIN WITH BILATERAL SCIATICA: Primary | ICD-10-CM

## 2019-10-24 DIAGNOSIS — G89.29 CHRONIC MIDLINE LOW BACK PAIN WITH BILATERAL SCIATICA: Primary | ICD-10-CM

## 2019-10-24 DIAGNOSIS — F41.9 ANXIETY: ICD-10-CM

## 2019-10-24 DIAGNOSIS — M54.41 CHRONIC MIDLINE LOW BACK PAIN WITH BILATERAL SCIATICA: Primary | ICD-10-CM

## 2019-10-24 PROCEDURE — 97140 MANUAL THERAPY 1/> REGIONS: CPT | Performed by: PHYSICAL THERAPIST

## 2019-10-24 PROCEDURE — 97110 THERAPEUTIC EXERCISES: CPT | Performed by: PHYSICAL THERAPIST

## 2019-10-24 NOTE — PROGRESS NOTES
Daily Note     Today's date: 10/24/2019  Patient name: Lupe Fuller  : 1970  MRN: [id number]  Referring provider: Shannon Prather MD  Dx:   Encounter Diagnosis     ICD-10-CM    1  Chronic midline low back pain with bilateral sciatica M54 41     M54 42     G89 29                   Subjective: There has been no improvement, still very stiff and sore, my right hip is more painful  Objective: See treatment diary below      Assessment: Tolerated treatment well  Patient exhibited good technique with therapeutic exercises      Plan: Continue per plan of care        Precautions: standard    Manual  9/30   10/24         STM psoas perf            Jt mob sacral flexion    Gr4 5x                                                    Exercise Diary  9/30 10/8 10/17 10/24         Knee to chest 10x   10x standing         Piriformis stretch 10x            Hamstring stretch 10x   5x standing         Hip Abd SDLY 10x            Clamshell 10x            Reverse clamshell 10x            Neurac supine pelvic lift  2x5 2x5          Neurac Bridge  2x5 2x5          Neurac SDLY Hip ABD  2x5 2x5          Neurac SDLY Hip ADD  2x5 2x5          Neurac prone plank  2x5 2x5          Neurac kneeling sh flex  2x5 2x5          Pelvic press    5x         Sphynx    5x         Prone press up    5x         FWD Lunge elbow to foot    5x         BKW lunge w twist    5x         PB Supine pelvic lift    10x                                       Modalities

## 2019-10-25 RX ORDER — ALPRAZOLAM 0.25 MG/1
TABLET ORAL
Qty: 30 TABLET | Refills: 0 | OUTPATIENT
Start: 2019-10-25

## 2019-10-29 ENCOUNTER — OFFICE VISIT (OUTPATIENT)
Dept: PHYSICAL THERAPY | Facility: CLINIC | Age: 49
End: 2019-10-29
Payer: COMMERCIAL

## 2019-10-29 DIAGNOSIS — M54.42 CHRONIC MIDLINE LOW BACK PAIN WITH BILATERAL SCIATICA: Primary | ICD-10-CM

## 2019-10-29 DIAGNOSIS — G89.29 CHRONIC MIDLINE LOW BACK PAIN WITH BILATERAL SCIATICA: Primary | ICD-10-CM

## 2019-10-29 DIAGNOSIS — M54.41 CHRONIC MIDLINE LOW BACK PAIN WITH BILATERAL SCIATICA: Primary | ICD-10-CM

## 2019-10-29 PROCEDURE — 97110 THERAPEUTIC EXERCISES: CPT

## 2019-10-29 NOTE — PROGRESS NOTES
Daily Note     Today's date: 10/29/2019  Patient name: Shavonne Menchaca  : 1970  MRN: [id number]  Referring provider: Jarod Haney MD  Dx:   Encounter Diagnosis     ICD-10-CM    1  Chronic midline low back pain with bilateral sciatica M54 41     M54 42     G89 29                   Subjective: Sore today  Probably from longer commute home last night  Objective: See treatment diary below      Assessment: Tolerated treatment fair  Patient would benefit from continued PT   Educated in proper sitting posture & car seat correction / adjustment  Plan: Progress treatment as tolerated         Precautions: standard    Manual  9/30   10/24 10/29        STM psoas perf    --        Jt mob sacral flexion    Gr4 5x --             B HS / piriformis stretching                                      Exercise Diary  9/30 10/8 10/17 10/24 10/29        Knee to chest 10x   10x standing Heel slidesx 20 reps red pb        Piriformis stretch 10x    manual        Hamstring stretch 10x   5x standing manual        Hip Abd SDLY 10x    --        Clamshell 10x    Blue tb x 20 reps        Reverse clamshell 10x    ---        Neurac supine pelvic lift  2x5 2x5  ---        Neurac Bridge  2x5 2x5  bridge w/ add & glut sets x10 reps        Neurac SDLY Hip ABD  2x5 2x5  ---        Neurac SDLY Hip ADD  2x5 2x5  ---        Neurac prone plank  2x5 2x5  KHARI x 1min        Neurac kneeling sh flex  2x5 2x5  ---        Pelvic press    5x --        Sphynx    5x --        Prone press up    5x 2x5 reps        FWD Lunge elbow to foot    5x --        BKW lunge w twist    5x --        PB Supine pelvic lift    10x ---        NuStep      10 min L1        Lumbar rotation w/ red pb     10x            Modalities

## 2019-10-31 ENCOUNTER — OFFICE VISIT (OUTPATIENT)
Dept: PHYSICAL THERAPY | Facility: CLINIC | Age: 49
End: 2019-10-31
Payer: COMMERCIAL

## 2019-10-31 DIAGNOSIS — G89.29 CHRONIC MIDLINE LOW BACK PAIN WITH BILATERAL SCIATICA: Primary | ICD-10-CM

## 2019-10-31 DIAGNOSIS — M54.41 CHRONIC MIDLINE LOW BACK PAIN WITH BILATERAL SCIATICA: Primary | ICD-10-CM

## 2019-10-31 DIAGNOSIS — M54.42 CHRONIC MIDLINE LOW BACK PAIN WITH BILATERAL SCIATICA: Primary | ICD-10-CM

## 2019-10-31 PROCEDURE — 97140 MANUAL THERAPY 1/> REGIONS: CPT

## 2019-10-31 PROCEDURE — 97110 THERAPEUTIC EXERCISES: CPT

## 2019-10-31 NOTE — PROGRESS NOTES
Daily Note     Today's date: 10/31/2019  Patient name: Pham Haney  : 1970  MRN: [id number]  Referring provider: Josy Gómez MD  Dx:   Encounter Diagnosis     ICD-10-CM    1  Chronic midline low back pain with bilateral sciatica M54 41     M54 42     G89 29                   Subjective: My back is actually feeling better today  Adjusted my car seat & trying to improve my posture  Objective: See treatment diary below      Assessment: Tolerated treatment well  Patient would benefit from continued PT   + TTP B medial HS       Plan: Progress treatment as tolerated         Precautions: standard    Manual  9/30   10/24 10/29 10/31       STM psoas perf    -- ---       Jt mob sacral flexion    Gr4 5x -- ---            B HS / piriformis stretching performed             B HS STM                        Exercise Diary  9/30 10/8 10/17 10/24 10/29 10/31       Knee to chest 10x   10x standing Heel slidesx 20 reps red pb 20x       Piriformis stretch 10x    manual manually       Hamstring stretch 10x   5x standing manual manually       Hip Abd SDLY 10x    -- --       Clamshell 10x    Blue tb x 20 reps 20 x blue tb       Reverse clamshell 10x    --- ---       Neurac supine pelvic lift  2x5 2x5  --- ---       Neurac Bridge  2x5 2x5  bridge w/ add & glut sets x10 reps bridge on red pb 2x10       Neurac SDLY Hip ABD  2x5 2x5  --- ---       Neurac SDLY Hip ADD  2x5 2x5  --- --       Neurac prone plank  2x5 2x5  KHARI x 1min KHARI x1min       Neurac kneeling sh flex  2x5 2x5  --- ---       Pelvic press    5x -- --       Sphynx    5x -- --       Prone press up    5x 2x5 reps ---       FWD Lunge elbow to foot    5x -- --       BKW lunge w twist    5x -- --       PB Supine pelvic lift    10x --- --       NuStep      10 min L1 10min L1       Lumbar rotation w/ red pb     10x 20x           Modalities

## 2019-11-04 ENCOUNTER — OFFICE VISIT (OUTPATIENT)
Dept: PHYSICAL THERAPY | Facility: CLINIC | Age: 49
End: 2019-11-04
Payer: COMMERCIAL

## 2019-11-04 DIAGNOSIS — M54.41 CHRONIC MIDLINE LOW BACK PAIN WITH BILATERAL SCIATICA: Primary | ICD-10-CM

## 2019-11-04 DIAGNOSIS — G89.29 CHRONIC MIDLINE LOW BACK PAIN WITH BILATERAL SCIATICA: Primary | ICD-10-CM

## 2019-11-04 DIAGNOSIS — M54.42 CHRONIC MIDLINE LOW BACK PAIN WITH BILATERAL SCIATICA: Primary | ICD-10-CM

## 2019-11-04 PROCEDURE — 97110 THERAPEUTIC EXERCISES: CPT | Performed by: PHYSICAL THERAPIST

## 2019-11-04 PROCEDURE — 97112 NEUROMUSCULAR REEDUCATION: CPT | Performed by: PHYSICAL THERAPIST

## 2019-11-04 NOTE — PROGRESS NOTES
Daily Note     Today's date: 2019  Patient name: Oumar Will  : 1970  MRN: [id number]  Referring provider: Yolanda Maciel MD  Dx:   Encounter Diagnosis     ICD-10-CM    1  Chronic midline low back pain with bilateral sciatica M54 41     M54 42     G89 29                   Subjective: I am not getting the severe sharp pain episodes, but still with deep stiffness  Objective: See treatment diary below      Assessment: Tolerated treatment well  Patient demonstrated fatigue post treatment and exhibited good technique with therapeutic exercises      Plan: Continue per plan of care        Precautions: standard    Manual  9/30   10/24 10/29 10/31       STM psoas perf    -- ---       Jt mob sacral flexion    Gr4 5x -- ---            B HS / piriformis stretching performed             B HS STM                        Exercise Diary  9/30 10/8 10/17 10/24 10/29 10/31 11/4      Knee to chest 10x   10x standing Heel slidesx 20 reps red pb 20x       Piriformis stretch 10x    manual manually       Hamstring stretch 10x   5x standing manual manually       Hip Abd SDLY 10x    -- --       Clamshell 10x    Blue tb x 20 reps 20 x blue tb       Reverse clamshell 10x    --- ---       Neurac supine pelvic lift  2x5 2x5  --- --- 2x5      Neurac Bridge  2x5 2x5  bridge w/ add & glut sets x10 reps bridge on red pb 2x10 2x4      Neurac SDLY Hip ABD  2x5 2x5  --- --- 2x5      Neurac SDLY Hip ADD  2x5 2x5  --- -- 2x5      Neurac prone plank  2x5 2x5  KHARI x 1min KHARI x1min 2x5      Neurac kneeling sh flex  2x5 2x5  --- ---       Pelvic press    5x -- --       Sphynx    5x -- --       Prone press up    5x 2x5 reps ---       FWD Lunge elbow to foot    5x -- --       BKW lunge w twist    5x -- --       PB Supine pelvic lift    10x --- --       NuStep      10 min L1 10min L1       Lumbar rotation w/ red pb     10x 20x           Modalities

## 2019-11-07 ENCOUNTER — OFFICE VISIT (OUTPATIENT)
Dept: PHYSICAL THERAPY | Facility: CLINIC | Age: 49
End: 2019-11-07
Payer: COMMERCIAL

## 2019-11-07 DIAGNOSIS — M54.42 CHRONIC MIDLINE LOW BACK PAIN WITH BILATERAL SCIATICA: Primary | ICD-10-CM

## 2019-11-07 DIAGNOSIS — M54.41 CHRONIC MIDLINE LOW BACK PAIN WITH BILATERAL SCIATICA: Primary | ICD-10-CM

## 2019-11-07 DIAGNOSIS — G89.29 CHRONIC MIDLINE LOW BACK PAIN WITH BILATERAL SCIATICA: Primary | ICD-10-CM

## 2019-11-07 PROCEDURE — 97112 NEUROMUSCULAR REEDUCATION: CPT | Performed by: PHYSICAL THERAPIST

## 2019-11-07 PROCEDURE — 97110 THERAPEUTIC EXERCISES: CPT | Performed by: PHYSICAL THERAPIST

## 2019-11-07 NOTE — PROGRESS NOTES
Daily Note     Today's date: 2019  Patient name: Panda Sanon  : 1970  MRN: [id number]  Referring provider: April Navarro MD  Dx:   Encounter Diagnosis     ICD-10-CM    1  Chronic midline low back pain with bilateral sciatica M54 41     M54 42     G89 29                   Subjective: Overall my back and hip pain is less, working hard to be aware of posture  Objective: See treatment diary below      Assessment: Tolerated treatment well  Patient demonstrated fatigue post treatment and exhibited good technique with therapeutic exercises      Plan: Continue per plan of care        Precautions: standard    Manual  9/30   10/24 10/29 10/31       STM psoas perf    -- ---       Jt mob sacral flexion    Gr4 5x -- ---            B HS / piriformis stretching performed             B HS STM                        Exercise Diary  9/30 10/8 10/17 10/24 10/29 10/31 11/4 11/7     Knee to chest 10x   10x standing Heel slidesx 20 reps red pb 20x       Piriformis stretch 10x    manual manually       Hamstring stretch 10x   5x standing manual manually       Hip Abd SDLY 10x    -- --       Clamshell 10x    Blue tb x 20 reps 20 x blue tb       Reverse clamshell 10x    --- ---       Neurac supine pelvic lift  2x5 2x5  --- --- 2x5      Neurac Bridge  2x5 2x5  bridge w/ add & glut sets x10 reps bridge on red pb 2x10 2x4 2x5     Neurac SDLY Hip ABD  2x5 2x5  --- --- 2x5 2x5     Neurac SDLY Hip ADD  2x5 2x5  --- -- 2x5 2x5     Neurac prone plank  2x5 2x5  KHARI x 1min KHARI x1min 2x5 2x5     Neurac kneeling sh flex  2x5 2x5  --- ---  2x5     Pelvic press    5x -- --       Sphynx    5x -- --       Prone press up    5x 2x5 reps ---       FWD Lunge elbow to foot    5x -- --       BKW lunge w twist    5x -- --       PB Supine pelvic lift    10x --- --       NuStep      10 min L1 10min L1       Lumbar rotation w/ red pb     10x 20x           Modalities

## 2019-11-11 ENCOUNTER — APPOINTMENT (OUTPATIENT)
Dept: PHYSICAL THERAPY | Facility: CLINIC | Age: 49
End: 2019-11-11
Payer: COMMERCIAL

## 2019-11-11 ENCOUNTER — OFFICE VISIT (OUTPATIENT)
Dept: FAMILY MEDICINE CLINIC | Facility: CLINIC | Age: 49
End: 2019-11-11
Payer: COMMERCIAL

## 2019-11-11 VITALS
DIASTOLIC BLOOD PRESSURE: 72 MMHG | OXYGEN SATURATION: 99 % | BODY MASS INDEX: 26.66 KG/M2 | HEART RATE: 68 BPM | RESPIRATION RATE: 14 BRPM | WEIGHT: 180 LBS | SYSTOLIC BLOOD PRESSURE: 110 MMHG | TEMPERATURE: 97.2 F | HEIGHT: 69 IN

## 2019-11-11 DIAGNOSIS — J01.00 ACUTE NON-RECURRENT MAXILLARY SINUSITIS: Primary | ICD-10-CM

## 2019-11-11 DIAGNOSIS — F41.9 ANXIETY: ICD-10-CM

## 2019-11-11 PROCEDURE — 99213 OFFICE O/P EST LOW 20 MIN: CPT | Performed by: FAMILY MEDICINE

## 2019-11-11 RX ORDER — ALPRAZOLAM 0.25 MG/1
0.25 TABLET ORAL
Qty: 14 TABLET | Refills: 0 | Status: SHIPPED | OUTPATIENT
Start: 2019-11-11 | End: 2019-12-11 | Stop reason: SDUPTHER

## 2019-11-11 RX ORDER — AMOXICILLIN AND CLAVULANATE POTASSIUM 875; 125 MG/1; MG/1
1 TABLET, FILM COATED ORAL EVERY 12 HOURS SCHEDULED
Qty: 20 TABLET | Refills: 0 | Status: SHIPPED | OUTPATIENT
Start: 2019-11-11 | End: 2019-11-18

## 2019-11-11 NOTE — PROGRESS NOTES
Assessment/Plan:    1  Acute non-recurrent maxillary sinusitis  -     amoxicillin-clavulanate (AUGMENTIN) 875-125 mg per tablet; Take 1 tablet by mouth every 12 (twelve) hours for 7 days    Patient given augmentin for sinus infection  Patient should take medication and side effects reviewed including diarrhea and rash  Patient should follow up if worsening symptoms  Precautions reviewed  Patient agrees with plan  Supportive care with fluids and rest        BMI Counseling: Body mass index is 26 58 kg/m²  Discussed the patient's BMI with him  The BMI is above normal  Nutrition recommendations include consuming healthier snacks  Exercise recommendations include strength training exercises  BMI Counseling: Body mass index is 26 58 kg/m²  The BMI is above normal  Nutrition recommendations include consuming healthier snacks  Exercise recommendations include strength training exercises  No pharmacotherapy was ordered  Subjective:      Patient ID: Shavonne Menchaca is a 52 y o  male  Chief Complaint   Patient presents with    Facial Pain     pt  c/o sinus pain/pressure and headache, earpain       HPI     Sinus Pain  Patient complains of congestion, cough, headaches, post nasal drip, sinus pressure, sore throat and ear pressure right and dizziness  Onset of symptoms was 1 month ago  Symptoms have been gradually worsening since that time  He is drinking plenty of fluids  Past history is significant for seasonal allergies  Patient is non-smoker  OTC medications tried: Tylenol sinus cold for temporary relief  Has chills and feels warm  Has a history of seasonal allergies and was on singulair and no longer taking it as it does not help  Felt like it was worsening today and came in       The following portions of the patient's history were reviewed and updated as appropriate: allergies, current medications, past family history, past medical history, past social history, past surgical history and problem list     Review of Systems   Constitutional: Positive for chills, fatigue and fever  Negative for appetite change  HENT: Positive for congestion, ear pain and sinus pressure  Negative for ear discharge, sore throat and trouble swallowing  Eyes: Negative for photophobia and visual disturbance  Respiratory: Positive for shortness of breath (due to congestion )  Negative for cough  Cardiovascular: Negative for chest pain and leg swelling  Gastrointestinal: Negative for abdominal pain, constipation, nausea and vomiting  Genitourinary: Negative for dysuria  Skin: Negative for color change and pallor  Neurological: Positive for headaches (has chronic migraines)  Negative for dizziness, weakness and light-headedness  Psychiatric/Behavioral: Negative for agitation  Current Outpatient Medications   Medication Sig Dispense Refill    ALPRAZolam (XANAX) 0 25 mg tablet take 1 tablet by mouth once daily at bedtime AS NEEDED FOR ANEXIETY (Patient not taking: Reported on 11/11/2019) 30 tablet 0    amoxicillin-clavulanate (AUGMENTIN) 875-125 mg per tablet Take 1 tablet by mouth every 12 (twelve) hours for 7 days 20 tablet 0    magnesium oxide (MAG-OX) 400 mg Take 1 tablet (400 mg total) by mouth daily (Patient not taking: Reported on 11/11/2019) 30 tablet 2    montelukast (SINGULAIR) 10 mg tablet Take 1 tablet (10 mg total) by mouth daily at bedtime (Patient not taking: Reported on 11/11/2019) 30 tablet 6    SUMAtriptan (IMITREX) 100 mg tablet Take 1 tablet (100 mg total) by mouth once as needed for migraine for up to 1 dose (Patient not taking: Reported on 11/11/2019) 9 tablet 0     No current facility-administered medications for this visit          Objective:    /72 (BP Location: Left arm, Patient Position: Sitting, Cuff Size: Adult)   Pulse 68   Temp (!) 97 2 °F (36 2 °C) (Tympanic)   Resp 14   Ht 5' 9" (1 753 m)   Wt 81 6 kg (180 lb)   SpO2 99%   BMI 26 58 kg/m²        Physical Exam Constitutional: He is oriented to person, place, and time  He appears well-developed and well-nourished  HENT:   Head: Normocephalic and atraumatic  Left Ear: External ear normal    Nose: Nose normal    Mouth/Throat: Oropharynx is clear and moist  No oropharyngeal exudate  Fluid behind right TM, no erythema or discharge    Eyes: EOM are normal  Right eye exhibits no discharge  Left eye exhibits no discharge  Neck: Normal range of motion  Neck supple  Cardiovascular: Normal rate, regular rhythm, normal heart sounds and intact distal pulses  Pulmonary/Chest: Effort normal and breath sounds normal  He has no wheezes  Abdominal: Soft  Bowel sounds are normal  There is no tenderness  Musculoskeletal: He exhibits no edema or tenderness  Neurological: He is alert and oriented to person, place, and time  Skin: Skin is warm  No erythema  Psychiatric: He has a normal mood and affect  His behavior is normal    Vitals reviewed               Kacie Correa MD

## 2019-11-14 ENCOUNTER — OFFICE VISIT (OUTPATIENT)
Dept: PHYSICAL THERAPY | Facility: CLINIC | Age: 49
End: 2019-11-14
Payer: COMMERCIAL

## 2019-11-14 DIAGNOSIS — M54.41 CHRONIC MIDLINE LOW BACK PAIN WITH BILATERAL SCIATICA: Primary | ICD-10-CM

## 2019-11-14 DIAGNOSIS — M54.42 CHRONIC MIDLINE LOW BACK PAIN WITH BILATERAL SCIATICA: Primary | ICD-10-CM

## 2019-11-14 DIAGNOSIS — G89.29 CHRONIC MIDLINE LOW BACK PAIN WITH BILATERAL SCIATICA: Primary | ICD-10-CM

## 2019-11-14 PROCEDURE — 97110 THERAPEUTIC EXERCISES: CPT | Performed by: PHYSICAL THERAPIST

## 2019-11-14 PROCEDURE — 97112 NEUROMUSCULAR REEDUCATION: CPT | Performed by: PHYSICAL THERAPIST

## 2019-11-14 NOTE — PROGRESS NOTES
Daily Note     Today's date: 2019  Patient name: Florence Green  : 1970  MRN: [id number]  Referring provider: Mandy Concepcion MD  Dx:   Encounter Diagnosis     ICD-10-CM    1  Chronic midline low back pain with bilateral sciatica M54 41     M54 42     G89 29                   Subjective: Still with bilateral hip tightness, right sided back pain  Objective: See treatment diary below      Assessment: Tolerated treatment well  Patient demonstrated fatigue post treatment, exhibited good technique with therapeutic exercises and would benefit from continued PT      Plan: Continue per plan of care        Precautions: standard    Manual  9/30   10/24 10/29 10/31       STM psoas perf    -- ---       Jt mob sacral flexion    Gr4 5x -- ---            B HS / piriformis stretching performed             B HS STM                        Exercise Diary  9/30 10/8 10/17 10/24 10/29 10/31 11/4 11/7 11/14    Knee to chest 10x   10x standing Heel slidesx 20 reps red pb 20x   20x    Piriformis stretch 10x    manual manually   20x    Hamstring stretch 10x   5x standing manual manually   20x    Hip Abd SDLY 10x    -- --   20x    Clamshell 10x    Blue tb x 20 reps 20 x blue tb   20x    Reverse clamshell 10x    --- ---   20x    Neurac supine pelvic lift  2x5 2x5  --- --- 2x5      Neurac Bridge  2x5 2x5  bridge w/ add & glut sets x10 reps bridge on red pb 2x10 2x4 2x5     Neurac SDLY Hip ABD  2x5 2x5  --- --- 2x5 2x5     Neurac SDLY Hip ADD  2x5 2x5  --- -- 2x5 2x5     Neurac prone plank  2x5 2x5  KHARI x 1min KHARI x1min 2x5 2x5     Neurac kneeling sh flex  2x5 2x5  --- ---  2x5     Pelvic press    5x -- --       Sphynx    5x -- --       Prone press up    5x 2x5 reps ---       FWD Lunge elbow to foot    5x -- --       BKW lunge w twist    5x -- --       PB Supine pelvic lift    10x --- --       NuStep      10 min L1 10min L1       Lumbar rotation w/ red pb     10x 20x           Modalities

## 2019-11-18 ENCOUNTER — OFFICE VISIT (OUTPATIENT)
Dept: PHYSICAL THERAPY | Facility: CLINIC | Age: 49
End: 2019-11-18
Payer: COMMERCIAL

## 2019-11-18 DIAGNOSIS — M54.41 CHRONIC MIDLINE LOW BACK PAIN WITH BILATERAL SCIATICA: Primary | ICD-10-CM

## 2019-11-18 DIAGNOSIS — M54.42 CHRONIC MIDLINE LOW BACK PAIN WITH BILATERAL SCIATICA: Primary | ICD-10-CM

## 2019-11-18 DIAGNOSIS — G89.29 CHRONIC MIDLINE LOW BACK PAIN WITH BILATERAL SCIATICA: Primary | ICD-10-CM

## 2019-11-18 PROCEDURE — 97112 NEUROMUSCULAR REEDUCATION: CPT | Performed by: PHYSICAL THERAPIST

## 2019-11-18 PROCEDURE — 97110 THERAPEUTIC EXERCISES: CPT | Performed by: PHYSICAL THERAPIST

## 2019-11-18 NOTE — PROGRESS NOTES
Daily Note     Today's date: 2019  Patient name: David Harrington  : 1970  MRN: [id number]  Referring provider: Ed Figueroa MD  Dx:   Encounter Diagnosis     ICD-10-CM    1  Chronic midline low back pain with bilateral sciatica M54 41     M54 42     G89 29                   Subjective: Overall I have less pain, doing my HEP  Objective: See treatment diary below      Assessment: Tolerated treatment well  Patient demonstrated fatigue post treatment      Plan: Continue per plan of care        Precautions: standard    Manual  9/30   10/24 10/29 10/31       STM psoas perf    -- ---       Jt mob sacral flexion    Gr4 5x -- ---            B HS / piriformis stretching performed             B HS STM                        Exercise Diary  9/30 10/8 10/17 10/24 10/29 10/31 11/4 11/7 11/14 11/18   Knee to chest 10x   10x standing Heel slidesx 20 reps red pb 20x   20x    Piriformis stretch 10x    manual manually   20x    Hamstring stretch 10x   5x standing manual manually   20x    Hip Abd SDLY 10x    -- --   20x    Clamshell 10x    Blue tb x 20 reps 20 x blue tb   20x    Reverse clamshell 10x    --- ---   20x    Neurac supine pelvic lift  2x5 2x5  --- --- 2x5      Neurac Bridge  2x5 2x5  bridge w/ add & glut sets x10 reps bridge on red pb 2x10 2x4 2x5     Neurac SDLY Hip ABD  2x5 2x5  --- --- 2x5 2x5     Neurac SDLY Hip ADD  2x5 2x5  --- -- 2x5 2x5     Neurac prone plank  2x5 2x5  KHARI x 1min KHARI x1min 2x5 2x5     Neurac kneeling sh flex  2x5 2x5  --- ---  2x5     Pelvic press    5x -- --       Sphynx    5x -- --       Prone press up    5x 2x5 reps ---       FWD Lunge elbow to foot    5x -- --       BKW lunge w twist    5x -- --       PB Supine pelvic lift    10x --- --       NuStep      10 min L1 10min L1       Lumbar rotation w/ red pb     10x 20x           Michelle lumbar stabilization           4x20   Shoulder wheel          20x

## 2019-11-20 ENCOUNTER — CONSULT (OUTPATIENT)
Dept: UROLOGY | Facility: CLINIC | Age: 49
End: 2019-11-20
Payer: COMMERCIAL

## 2019-11-20 VITALS
HEART RATE: 68 BPM | HEIGHT: 70 IN | SYSTOLIC BLOOD PRESSURE: 110 MMHG | BODY MASS INDEX: 25.83 KG/M2 | DIASTOLIC BLOOD PRESSURE: 70 MMHG | WEIGHT: 180.4 LBS

## 2019-11-20 DIAGNOSIS — R35.1 NOCTURIA: ICD-10-CM

## 2019-11-20 PROCEDURE — 99244 OFF/OP CNSLTJ NEW/EST MOD 40: CPT | Performed by: UROLOGY

## 2019-11-20 NOTE — PROGRESS NOTES
11/20/2019    Sharlene Sessions  1970  [id number]    Discussion and Plan    Patient's pelvic and lower leg symptoms are clearly spinal in origin  Prostate examination is normal with no evidence of enlargement or nodularity  As he is almost 48years old, we discussed risks and benefits of prostate cancer screening  PSA will be obtained now and again in 1 year prior to next visit  All questions answered at this time  1  Nocturia  - Ambulatory referral to Urology  - PSA Total, Diagnostic; Future  - PSA Total, Diagnostic; Future    Assessment      Patient Active Problem List   Diagnosis    BRBPR (bright red blood per rectum)    Rectal burning    Change in bowel movement    Anxiety       History of Present Illness    Jami Dawkins is a 52 y o  male seen today in regards to a history of nocturia times 1-2 and lower back and pelvic pain  He has a longstanding history of spinal issues were he had undergone cervical fusion remotely  Also remote history of hydrocelectomy  He denies any significant changes in urinary pattern noting good flow with complete bladder emptying sensation  Denies frequency or urgency  He has nocturia times 1-2  No prior urinary tract infection or hematuria  Father had history of BPH requiring TURP  No familial history of prostate cancer  Urinary Symptom Assessment        Past Medical History  Past Medical History:   Diagnosis Date    Anxiety     Back pain     Headache     Neck pain     Vertigo        Past Social History  Past Surgical History:   Procedure Laterality Date    APPENDECTOMY      CERVICAL FUSION N/A     HYDROCELE EXCISION / REPAIR      MA COLONOSCOPY FLX DX W/COLLJ SPEC WHEN PFRMD N/A 4/9/2018    Procedure: COLONOSCOPY;  Surgeon: Carroll Villagomez MD;  Location: [facility];   Service: Gastroenterology    ROTATOR CUFF REPAIR Right     SHOULDER SURGERY Left        Past Family History  Family History   Problem Relation Age of Onset    Breast cancer Mother    [facility] Diabetes Father     Hypertension Father     No Known Problems Sister     No Known Problems Brother     No Known Problems Daughter     No Known Problems Daughter        Past Social history  Social History     Socioeconomic History    Marital status: /Civil Union     Spouse name: Not on file    Number of children: Not on file    Years of education: Not on file    Highest education level: Not on file   Occupational History    Not on file   Social Needs    Financial resource strain: Not on file    Food insecurity:     Worry: Not on file     Inability: Not on file    Transportation needs:     Medical: Not on file     Non-medical: Not on file   Tobacco Use    Smoking status: Former Smoker    Smokeless tobacco: Never Used   Substance and Sexual Activity    Alcohol use: No    Drug use: No    Sexual activity: Yes   Lifestyle    Physical activity:     Days per week: Not on file     Minutes per session: Not on file    Stress: Not on file   Relationships    Social connections:     Talks on phone: Not on file     Gets together: Not on file     Attends Muslim service: Not on file     Active member of club or organization: Not on file     Attends meetings of clubs or organizations: Not on file     Relationship status: Not on file    Intimate partner violence:     Fear of current or ex partner: Not on file     Emotionally abused: Not on file     Physically abused: Not on file     Forced sexual activity: Not on file   Other Topics Concern    Not on file   Social History Narrative    Not on file       Current Medications  Current Outpatient Medications   Medication Sig Dispense Refill    ALPRAZolam (XANAX) 0 25 mg tablet Take 1 tablet (0 25 mg total) by mouth daily at bedtime as needed for anxiety 14 tablet 0    magnesium oxide (MAG-OX) 400 mg Take 1 tablet (400 mg total) by mouth daily 30 tablet 2    montelukast (SINGULAIR) 10 mg tablet Take 1 tablet (10 mg total) by mouth daily at bedtime 30 tablet 6    SUMAtriptan (IMITREX) 100 mg tablet Take 1 tablet (100 mg total) by mouth once as needed for migraine for up to 1 dose 9 tablet 0     No current facility-administered medications for this visit  Allergies  No Known Allergies    Past Medical History, Social History, Family History, medications and allergies were reviewed  Review of Systems  Review of Systems   Constitutional: Negative  HENT: Negative  Eyes: Negative  Respiratory: Negative  Cardiovascular: Negative  Gastrointestinal: Negative  Endocrine: Negative  Genitourinary: Negative for decreased urine volume, difficulty urinating, hematuria and urgency  Musculoskeletal: Negative  Skin: Negative  Neurological: Negative  Hematological: Negative  Psychiatric/Behavioral: Negative  Vitals  Vitals:    11/20/19 1329   BP: 110/70   BP Location: Left arm   Patient Position: Sitting   Cuff Size: Adult   Pulse: 68   Weight: 81 8 kg (180 lb 6 4 oz)   Height: 5' 10" (1 778 m)         Physical Exam    Physical Exam   Constitutional: He is oriented to person, place, and time  He appears well-developed and well-nourished  HENT:   Head: Normocephalic and atraumatic  Eyes: Pupils are equal, round, and reactive to light  Neck: Normal range of motion  Cardiovascular: Normal rate, regular rhythm and normal heart sounds  Pulmonary/Chest: Effort normal and breath sounds normal  No accessory muscle usage  No respiratory distress  Abdominal: Soft  Normal appearance and bowel sounds are normal  There is no tenderness  Genitourinary: Rectum normal, prostate normal and penis normal  No penile tenderness  Genitourinary Comments: Prostate 20 grams  No nodules   Musculoskeletal: Normal range of motion  Neurological: He is alert and oriented to person, place, and time  Skin: Skin is warm, dry and intact  Psychiatric: He has a normal mood and affect   His speech is normal  Cognition and memory are normal  Nursing note and vitals reviewed        Results    Below listed labs, pathology results, and radiology images were personally reviewed:    No results found for: PSA  Lab Results   Component Value Date    CALCIUM 9 3 03/29/2018    K 4 6 03/29/2018    CO2 33 (H) 03/29/2018     03/29/2018    BUN 18 03/29/2018    CREATININE 0 97 03/29/2018     Lab Results   Component Value Date    WBC 5 5 03/29/2018    HGB 15 6 03/29/2018    HCT 46 7 03/29/2018    MCV 92 7 03/29/2018     03/29/2018       No results found for this or any previous visit (from the past 1 hour(s)) ]

## 2019-11-21 ENCOUNTER — OFFICE VISIT (OUTPATIENT)
Dept: PHYSICAL THERAPY | Facility: CLINIC | Age: 49
End: 2019-11-21
Payer: COMMERCIAL

## 2019-11-21 DIAGNOSIS — M54.42 CHRONIC MIDLINE LOW BACK PAIN WITH BILATERAL SCIATICA: Primary | ICD-10-CM

## 2019-11-21 DIAGNOSIS — M54.41 CHRONIC MIDLINE LOW BACK PAIN WITH BILATERAL SCIATICA: Primary | ICD-10-CM

## 2019-11-21 DIAGNOSIS — G89.29 CHRONIC MIDLINE LOW BACK PAIN WITH BILATERAL SCIATICA: Primary | ICD-10-CM

## 2019-11-21 PROCEDURE — 97112 NEUROMUSCULAR REEDUCATION: CPT | Performed by: PHYSICAL THERAPIST

## 2019-11-21 PROCEDURE — 97110 THERAPEUTIC EXERCISES: CPT | Performed by: PHYSICAL THERAPIST

## 2019-11-21 NOTE — PROGRESS NOTES
Daily Note     Today's date: 2019  Patient name: Anh Mcgee  : 1970  MRN: [id number]  Referring provider: Lubna Morales MD  Dx: No diagnosis found  Subjective: Overall my back is feeling better  Objective: See treatment diary below      Assessment: Tolerated treatment well  Patient demonstrated fatigue post treatment and exhibited good technique with therapeutic exercises      Plan: Continue per plan of care        Precautions: standard            Manual                                                     Exercise Diary         Nustep  10 min       Shoulder wheel 10x       Foot wheel 10x       Star crawl 10x       Hip Stretch standing  10x       Cat/camel 10x                                                                                                                           Modalities

## 2019-11-25 ENCOUNTER — OFFICE VISIT (OUTPATIENT)
Dept: PHYSICAL THERAPY | Facility: CLINIC | Age: 49
End: 2019-11-25
Payer: COMMERCIAL

## 2019-11-25 DIAGNOSIS — M54.41 CHRONIC MIDLINE LOW BACK PAIN WITH BILATERAL SCIATICA: Primary | ICD-10-CM

## 2019-11-25 DIAGNOSIS — G89.29 CHRONIC MIDLINE LOW BACK PAIN WITH BILATERAL SCIATICA: Primary | ICD-10-CM

## 2019-11-25 DIAGNOSIS — M54.42 CHRONIC MIDLINE LOW BACK PAIN WITH BILATERAL SCIATICA: Primary | ICD-10-CM

## 2019-11-25 PROCEDURE — 97112 NEUROMUSCULAR REEDUCATION: CPT | Performed by: PHYSICAL THERAPIST

## 2019-11-25 PROCEDURE — 97110 THERAPEUTIC EXERCISES: CPT | Performed by: PHYSICAL THERAPIST

## 2019-11-25 NOTE — PROGRESS NOTES
PT Discharge    Today's date: 2019  Patient name: Alejandrina Monsivais  : 1970  MRN: [id number]  Referring provider: Von Jimenez MD  Dx:   Encounter Diagnosis     ICD-10-CM    1  Chronic midline low back pain with bilateral sciatica M54 41     M54 42     G89 29                   Assessment  Assessment details: Alejandrina Monsivais has been seen for Chronic midline low back pain with bilateral sciatica  (primary encounter diagnosis),and has met the goals of physical therapy  And is independent with a HEP  Plan  Plan details: Discontinue PT        Subjective Evaluation    History of Present Illness  Mechanism of injury: I have much less back pain, but I get hip pain with prolonged sitting          Objective            Precautions: standard            Manual                                                     Exercise Diary        Nustep  10 min       Shoulder wheel 10x 15x      Foot wheel 10x 15x      Star crawl 10x 15x      Hip Stretch standing  10x 15x      Cat/camel 10x 15x                                                                                                                          Modalities

## 2019-11-27 ENCOUNTER — APPOINTMENT (OUTPATIENT)
Dept: PHYSICAL THERAPY | Facility: CLINIC | Age: 49
End: 2019-11-27
Payer: COMMERCIAL

## 2019-12-11 ENCOUNTER — OFFICE VISIT (OUTPATIENT)
Dept: FAMILY MEDICINE CLINIC | Facility: CLINIC | Age: 49
End: 2019-12-11
Payer: COMMERCIAL

## 2019-12-11 ENCOUNTER — TELEPHONE (OUTPATIENT)
Dept: FAMILY MEDICINE CLINIC | Facility: CLINIC | Age: 49
End: 2019-12-11

## 2019-12-11 VITALS
SYSTOLIC BLOOD PRESSURE: 116 MMHG | RESPIRATION RATE: 14 BRPM | TEMPERATURE: 97.9 F | BODY MASS INDEX: 26.05 KG/M2 | WEIGHT: 182 LBS | DIASTOLIC BLOOD PRESSURE: 70 MMHG | HEART RATE: 72 BPM | HEIGHT: 70 IN

## 2019-12-11 DIAGNOSIS — Z30.09 VASECTOMY EVALUATION: ICD-10-CM

## 2019-12-11 DIAGNOSIS — Z00.00 ROUTINE MEDICAL EXAM: Primary | ICD-10-CM

## 2019-12-11 DIAGNOSIS — F41.9 ANXIETY: ICD-10-CM

## 2019-12-11 DIAGNOSIS — T78.40XD ALLERGIC STATE, SUBSEQUENT ENCOUNTER: ICD-10-CM

## 2019-12-11 PROBLEM — R19.8 CHANGE IN BOWEL MOVEMENT: Status: RESOLVED | Noted: 2018-03-21 | Resolved: 2019-12-11

## 2019-12-11 PROBLEM — K62.5 BRBPR (BRIGHT RED BLOOD PER RECTUM): Status: RESOLVED | Noted: 2018-03-21 | Resolved: 2019-12-11

## 2019-12-11 PROBLEM — R20.8 RECTAL BURNING: Status: RESOLVED | Noted: 2018-03-21 | Resolved: 2019-12-11

## 2019-12-11 PROCEDURE — 99396 PREV VISIT EST AGE 40-64: CPT | Performed by: FAMILY MEDICINE

## 2019-12-11 RX ORDER — ALPRAZOLAM 0.25 MG/1
0.25 TABLET ORAL
Qty: 30 TABLET | Refills: 1 | Status: SHIPPED | OUTPATIENT
Start: 2019-12-11 | End: 2020-06-15 | Stop reason: SDUPTHER

## 2019-12-11 RX ORDER — FLUTICASONE PROPIONATE 50 MCG
2 SPRAY, SUSPENSION (ML) NASAL DAILY
Qty: 16 G | Refills: 6 | Status: SHIPPED | OUTPATIENT
Start: 2019-12-11

## 2019-12-11 NOTE — PROGRESS NOTES
Chief Complaint   Patient presents with    Physical Exam        Patient ID: Pham Haney is a 52 y o  male  HPI  Pt is seeing for CPE     The following portions of the patient's history were reviewed and updated as appropriate: allergies, current medications, past family history, past medical history, past social history, past surgical history and problem list     Review of Systems   Constitutional: Negative for fatigue, fever and unexpected weight change  HENT: Negative for congestion, ear discharge, ear pain, hearing loss, rhinorrhea, sinus pressure, sore throat and trouble swallowing  Eyes: Negative  Respiratory: Negative  Cardiovascular: Negative  Gastrointestinal: Negative  Endocrine: Negative  Genitourinary: Negative  Musculoskeletal: Positive for back pain and myalgias  Negative for gait problem, joint swelling, neck pain and neck stiffness  Skin: Negative  Neurological: Positive for headaches (cluster HA )  Negative for dizziness, weakness, light-headedness and numbness  Hematological: Negative  Psychiatric/Behavioral: Negative for agitation, behavioral problems, dysphoric mood, sleep disturbance and suicidal ideas  The patient is nervous/anxious  Current Outpatient Medications   Medication Sig Dispense Refill    ALPRAZolam (XANAX) 0 25 mg tablet Take 1 tablet (0 25 mg total) by mouth daily at bedtime as needed for anxiety 14 tablet 0    montelukast (SINGULAIR) 10 mg tablet Take 1 tablet (10 mg total) by mouth daily at bedtime 30 tablet 6     No current facility-administered medications for this visit  Objective:    /70 (BP Location: Left arm, Patient Position: Sitting, Cuff Size: Large)   Pulse 72   Temp 97 9 °F (36 6 °C) (Tympanic)   Resp 14   Ht 5' 10" (1 778 m)   Wt 82 6 kg (182 lb)   BMI 26 11 kg/m²        Physical Exam   Constitutional: He is oriented to person, place, and time  He appears well-developed and well-nourished  No distress  HENT:   Head: Normocephalic and atraumatic  Right Ear: Hearing, tympanic membrane, external ear and ear canal normal    Left Ear: Hearing, tympanic membrane, external ear and ear canal normal    Mouth/Throat: Oropharynx is clear and moist  No oropharyngeal exudate  Eyes: Conjunctivae and EOM are normal    Neck: Neck supple  No JVD present  No thyroid mass and no thyromegaly present  Cardiovascular: Regular rhythm and normal heart sounds  Exam reveals no gallop  No murmur heard  Pulmonary/Chest: Breath sounds normal  No respiratory distress  He has no wheezes  He has no rhonchi  He has no rales  Abdominal: Soft  Bowel sounds are normal  There is no tenderness  Musculoskeletal: He exhibits no edema or deformity  Lymphadenopathy:     He has no cervical adenopathy  Neurological: He is alert and oriented to person, place, and time  He has normal strength  No cranial nerve deficit  Skin: Skin is intact  No rash noted  No erythema  No pallor  Psychiatric: He has a normal mood and affect  His behavior is normal          Labs in chart were reviewed  Assessment/Plan:         Diagnoses and all orders for this visit:    Routine medical exam  -     Comprehensive metabolic panel; Future  -     Hemoglobin A1C; Future  -     Lipid panel; Future    Anxiety  -     ALPRAZolam (XANAX) 0 25 mg tablet; Take 1 tablet (0 25 mg total) by mouth daily at bedtime as needed for anxiety    Allergic state, subsequent encounter  -     fluticasone (FLONASE) 50 mcg/act nasal spray; 2 sprays into each nostril daily    Vasectomy evaluation  -     Ambulatory referral to Urology; Future            BMI Counseling: Body mass index is 26 11 kg/m²  Discussed the patient's BMI with him  The BMI is above normal  Exercise recommendations include exercising 3-5 times per week           rto in 1 y         Viviana Orellana MD

## 2019-12-31 LAB — HBA1C MFR BLD HPLC: 5.3 %

## 2020-01-01 LAB
ALBUMIN SERPL-MCNC: 4.2 G/DL (ref 3.5–5.5)
ALBUMIN/GLOB SERPL: 2.1 {RATIO} (ref 1.2–2.2)
ALP SERPL-CCNC: 59 IU/L (ref 39–117)
ALT SERPL-CCNC: 18 IU/L (ref 0–44)
AST SERPL-CCNC: 19 IU/L (ref 0–40)
BILIRUB SERPL-MCNC: 0.6 MG/DL (ref 0–1.2)
BUN SERPL-MCNC: 19 MG/DL (ref 6–24)
BUN/CREAT SERPL: 18 (ref 9–20)
CALCIUM SERPL-MCNC: 8.9 MG/DL (ref 8.7–10.2)
CHLORIDE SERPL-SCNC: 105 MMOL/L (ref 96–106)
CHOLEST SERPL-MCNC: 193 MG/DL (ref 100–199)
CO2 SERPL-SCNC: 26 MMOL/L (ref 20–29)
CREAT SERPL-MCNC: 1.08 MG/DL (ref 0.76–1.27)
GLOBULIN SER-MCNC: 2 G/DL (ref 1.5–4.5)
GLUCOSE SERPL-MCNC: 105 MG/DL (ref 65–99)
HBA1C MFR BLD: 5.3 % (ref 4.8–5.6)
HDLC SERPL-MCNC: 62 MG/DL
LDLC SERPL CALC-MCNC: 117 MG/DL (ref 0–99)
MICRODELETION SYND BLD/T FISH: NORMAL
POTASSIUM SERPL-SCNC: 4.3 MMOL/L (ref 3.5–5.2)
PROT SERPL-MCNC: 6.2 G/DL (ref 6–8.5)
SL AMB EGFR AFRICAN AMERICAN: 93 ML/MIN/1.73
SL AMB EGFR NON AFRICAN AMERICAN: 80 ML/MIN/1.73
SL AMB VLDL CHOLESTEROL CALC: 14 MG/DL (ref 5–40)
SODIUM SERPL-SCNC: 143 MMOL/L (ref 134–144)
TRIGL SERPL-MCNC: 71 MG/DL (ref 0–149)

## 2020-01-02 LAB — PSA SERPL-MCNC: 1.2 NG/ML (ref 0–4)

## 2020-01-03 ENCOUNTER — TELEPHONE (OUTPATIENT)
Dept: FAMILY MEDICINE CLINIC | Facility: CLINIC | Age: 50
End: 2020-01-03

## 2020-01-03 NOTE — TELEPHONE ENCOUNTER
----- Message from Luis Zapien MD sent at 1/3/2020  8:10 AM EST -----  Pl, advise pt -  All labs are normal except for fasting glucose 105 -  Little elevated -  Pt needs to cut carbs intake in his diet

## 2020-01-03 NOTE — TELEPHONE ENCOUNTER
----- Message from Elba Templeton MD sent at 1/3/2020  8:10 AM EST -----  Pl, advise pt -  All labs are normal except for fasting glucose 105 -  Little elevated -  Pt needs to cut carbs intake in his diet

## 2020-03-29 DIAGNOSIS — T78.40XD ALLERGIC STATE, SUBSEQUENT ENCOUNTER: ICD-10-CM

## 2020-03-30 RX ORDER — MONTELUKAST SODIUM 10 MG/1
TABLET ORAL
Qty: 30 TABLET | Refills: 6 | Status: SHIPPED | OUTPATIENT
Start: 2020-03-30 | End: 2021-05-07

## 2020-06-15 DIAGNOSIS — F41.9 ANXIETY: ICD-10-CM

## 2020-06-15 RX ORDER — ALPRAZOLAM 0.25 MG/1
0.25 TABLET ORAL
Qty: 30 TABLET | Refills: 1 | Status: SHIPPED | OUTPATIENT
Start: 2020-06-15 | End: 2021-01-25

## 2020-10-05 ENCOUNTER — OFFICE VISIT (OUTPATIENT)
Dept: FAMILY MEDICINE CLINIC | Facility: CLINIC | Age: 50
End: 2020-10-05
Payer: COMMERCIAL

## 2020-10-05 VITALS
TEMPERATURE: 98.3 F | HEART RATE: 72 BPM | HEIGHT: 70 IN | BODY MASS INDEX: 26.48 KG/M2 | SYSTOLIC BLOOD PRESSURE: 126 MMHG | RESPIRATION RATE: 16 BRPM | DIASTOLIC BLOOD PRESSURE: 80 MMHG | WEIGHT: 185 LBS

## 2020-10-05 DIAGNOSIS — R09.89 SINUS COMPLAINT: Primary | ICD-10-CM

## 2020-10-05 DIAGNOSIS — F41.9 ANXIETY: ICD-10-CM

## 2020-10-05 PROCEDURE — 99214 OFFICE O/P EST MOD 30 MIN: CPT | Performed by: FAMILY MEDICINE

## 2020-10-05 PROCEDURE — 3725F SCREEN DEPRESSION PERFORMED: CPT | Performed by: FAMILY MEDICINE

## 2020-10-05 PROCEDURE — 1036F TOBACCO NON-USER: CPT | Performed by: FAMILY MEDICINE

## 2020-10-05 RX ORDER — CITALOPRAM 10 MG/1
10 TABLET ORAL DAILY
Qty: 30 TABLET | Refills: 5 | Status: SHIPPED | OUTPATIENT
Start: 2020-10-05 | End: 2021-05-07

## 2020-10-05 RX ORDER — AMOXICILLIN AND CLAVULANATE POTASSIUM 875; 125 MG/1; MG/1
1 TABLET, FILM COATED ORAL EVERY 12 HOURS SCHEDULED
Qty: 14 TABLET | Refills: 0 | Status: SHIPPED | OUTPATIENT
Start: 2020-10-05 | End: 2020-10-12

## 2020-11-06 ENCOUNTER — CLINICAL SUPPORT (OUTPATIENT)
Dept: FAMILY MEDICINE CLINIC | Facility: CLINIC | Age: 50
End: 2020-11-06
Payer: COMMERCIAL

## 2020-11-06 DIAGNOSIS — Z23 NEED FOR INFLUENZA VACCINATION: Primary | ICD-10-CM

## 2020-11-06 PROCEDURE — 90471 IMMUNIZATION ADMIN: CPT

## 2020-11-06 PROCEDURE — 90682 RIV4 VACC RECOMBINANT DNA IM: CPT

## 2021-01-23 DIAGNOSIS — F41.9 ANXIETY: ICD-10-CM

## 2021-01-25 RX ORDER — ALPRAZOLAM 0.25 MG/1
TABLET ORAL
Qty: 30 TABLET | Refills: 0 | Status: SHIPPED | OUTPATIENT
Start: 2021-01-25 | End: 2021-05-07 | Stop reason: SDUPTHER

## 2021-02-09 ENCOUNTER — TELEMEDICINE (OUTPATIENT)
Dept: FAMILY MEDICINE CLINIC | Facility: CLINIC | Age: 51
End: 2021-02-09
Payer: COMMERCIAL

## 2021-02-09 DIAGNOSIS — R09.81 SINUS CONGESTION: Primary | ICD-10-CM

## 2021-02-09 DIAGNOSIS — R09.81 SINUS CONGESTION: ICD-10-CM

## 2021-02-09 PROCEDURE — 99213 OFFICE O/P EST LOW 20 MIN: CPT | Performed by: FAMILY MEDICINE

## 2021-02-09 PROCEDURE — 1036F TOBACCO NON-USER: CPT | Performed by: FAMILY MEDICINE

## 2021-02-09 PROCEDURE — U0005 INFEC AGEN DETEC AMPLI PROBE: HCPCS | Performed by: FAMILY MEDICINE

## 2021-02-09 PROCEDURE — U0003 INFECTIOUS AGENT DETECTION BY NUCLEIC ACID (DNA OR RNA); SEVERE ACUTE RESPIRATORY SYNDROME CORONAVIRUS 2 (SARS-COV-2) (CORONAVIRUS DISEASE [COVID-19]), AMPLIFIED PROBE TECHNIQUE, MAKING USE OF HIGH THROUGHPUT TECHNOLOGIES AS DESCRIBED BY CMS-2020-01-R: HCPCS | Performed by: FAMILY MEDICINE

## 2021-02-09 NOTE — PROGRESS NOTES
Virtual Regular Visit      Assessment/Plan:    Problem List Items Addressed This Visit     None      Visit Diagnoses     Sinus congestion    -  Primary    Relevant Orders    Novel Coronavirus (Covid-19),PCR [facility] - Collected at [facility]        rto prn        Reason for visit is   Chief Complaint   Patient presents with    Virtual Regular Visit        Encounter provider Clare Huizar MD    Provider located at [address] [place] [phone]      Recent Visits  No visits were found meeting these conditions  Showing recent visits within past 7 days and meeting all other requirements     Today's Visits  Date Type Provider Dept   02/09/21 Telemedicine Clare Huizar  [facility] today's visits and meeting all other requirements     Future Appointments  No visits were found meeting these conditions  Showing future appointments within next 150 days and meeting all other requirements        The patient was identified by name and date of birth  Jelenajalil Murphy was informed that this is a telemedicine visit and that the visit is being conducted through [facility] and patient was informed that this is a secure, HIPAA-compliant platform  He agrees to proceed     My office door was closed  No one else was in the room  He acknowledged consent and understanding of privacy and security of the video platform  The patient has agreed to participate and understands they can discontinue the visit at any time  Patient is aware this is a billable service       Ethel Morales is a 48 y o  male       Pt is seeing for sinus pressure and nasal congestion x 3 days, no fever, no cough, no SOB, no direct sick contacts  -  No therapy tried        Past Medical History:   Diagnosis Date    Anxiety     Back pain     Headache     Neck pain     Vertigo        Past Surgical History:   Procedure Laterality Date    APPENDECTOMY      CERVICAL FUSION N/A     HYDROCELE EXCISION / REPAIR      [place] COLONOSCOPY FLX DX W/COLLJ SPEC WHEN PFRMD N/A 4/9/2018    Procedure: COLONOSCOPY;  Surgeon: David Lopez MD;  Location: [facility]; Service: Gastroenterology    ROTATOR CUFF REPAIR Right     SHOULDER SURGERY Left        Current Outpatient Medications   Medication Sig Dispense Refill    ALPRAZolam (XANAX) 0 25 mg tablet take 1 tablet by mouth daily if needed at bedtime for anxiety 30 tablet 0    citalopram (CeleXA) 10 mg tablet Take 1 tablet (10 mg total) by mouth daily 30 tablet 5    fluticasone (FLONASE) 50 mcg/act nasal spray 2 sprays into each nostril daily 16 g 6    montelukast (SINGULAIR) 10 mg tablet take 1 tablet by mouth at bedtime 30 tablet 6     No current facility-administered medications for this visit  No Known Allergies    Review of Systems   Constitutional: Negative  HENT: Positive for congestion, postnasal drip and sinus pressure  Negative for sore throat and voice change  Respiratory: Negative  Gastrointestinal: Negative  Video Exam    There were no vitals filed for this visit  Physical Exam  Constitutional:       Appearance: He is not ill-appearing  Pulmonary:      Effort: Pulmonary effort is normal  No respiratory distress  Neurological:      Mental Status: He is alert and oriented to person, place, and time  I spent 5 minutes directly with the patient during this visit      VIRTUAL VISIT Daysi Bragg acknowledges that he has consented to an online visit or consultation  He understands that the online visit is based solely on information provided by him, and that, in the absence of a face-to-face physical evaluation by the physician, the diagnosis he receives is both limited and provisional in terms of accuracy and completeness  This is not intended to replace a full medical face-to-face evaluation by the physician   Kailey Mayers understands and accepts these terms

## 2021-02-10 ENCOUNTER — TELEPHONE (OUTPATIENT)
Dept: FAMILY MEDICINE CLINIC | Facility: CLINIC | Age: 51
End: 2021-02-10

## 2021-02-10 LAB — SARS-COV-2 RNA RESP QL NAA+PROBE: NEGATIVE

## 2021-02-10 NOTE — TELEPHONE ENCOUNTER
----- Message from Susana Bella MD sent at 2/10/2021  2:32 PM EST -----  Pl, advise pt -  Negative COVID test

## 2021-02-11 ENCOUNTER — TELEPHONE (OUTPATIENT)
Dept: FAMILY MEDICINE CLINIC | Facility: CLINIC | Age: 51
End: 2021-02-11

## 2021-02-11 NOTE — TELEPHONE ENCOUNTER
----- Message from Aleida Reeves MD sent at 2/10/2021  2:32 PM EST -----  Pl, advise pt -  Negative COVID test

## 2021-04-13 DIAGNOSIS — Z23 ENCOUNTER FOR IMMUNIZATION: ICD-10-CM

## 2021-04-22 ENCOUNTER — TELEMEDICINE (OUTPATIENT)
Dept: FAMILY MEDICINE CLINIC | Facility: CLINIC | Age: 51
End: 2021-04-22
Payer: COMMERCIAL

## 2021-04-22 DIAGNOSIS — J06.9 VIRAL URI: Primary | ICD-10-CM

## 2021-04-22 PROCEDURE — 99213 OFFICE O/P EST LOW 20 MIN: CPT | Performed by: FAMILY MEDICINE

## 2021-04-22 NOTE — PROGRESS NOTES
Virtual Regular Visit      Assessment/Plan:    Problem List Items Addressed This Visit     None      Visit Diagnoses     Viral URI    -  Primary        Likely viral given family had similar symptoms and tested negative for covid  Patient states improvement compared to yesterday  Monitor and supportive care  Advised if no improvement in the next 24-48 hours, patient should get tested  Precautions reviewed  Reason for visit is   Chief Complaint   Patient presents with    Virtual Regular Visit        Encounter provider Izabel Suarez MD    Provider located at [address] [place] [phone]      Recent Visits  No visits were found meeting these conditions  Showing recent visits within past 7 days and meeting all other requirements     Today's Visits  Date Type Provider Dept   04/22/21 Telemedicine Izabel Suarez  [facility] today's visits and meeting all other requirements     Future Appointments  No visits were found meeting these conditions  Showing future appointments within next 150 days and meeting all other requirements        The patient was identified by name and date of birth  Adelia Krishnamurthy was informed that this is a telemedicine visit and that the visit is being conducted through [facility] and patient was informed that this is a secure, HIPAA-compliant platform  He agrees to proceed     My office door was closed  No one else was in the room  He acknowledged consent and understanding of privacy and security of the video platform  The patient has agreed to participate and understands they can discontinue the visit at any time  Patient is aware this is a billable service  Subjective  Adelia Krishnamurthy is a 48 y o  male calls with concerns  Patient have allergies  Last couple days has body aches, with temperature   Head cold, scratchy throat, post nasal drip   His daughter got similar symptoms on Sunday and then his wife  Now he does  He called prior to his wife's covid swab which he just found out is negative  Patient states he had chills last night and feels he sweated it out  Denies chest pain or shortness of breath  No diarrhea or vomiting  Has chronic headaches so can't tell if this is new  HPI     Past Medical History:   Diagnosis Date    Anxiety     Back pain     Headache     Neck pain     Vertigo        Past Surgical History:   Procedure Laterality Date    APPENDECTOMY      CERVICAL FUSION N/A     HYDROCELE EXCISION / REPAIR      [place] COLONOSCOPY FLX DX W/COLLJ SPEC WHEN PFRMD N/A 4/9/2018    Procedure: COLONOSCOPY;  Surgeon: Pito Barillas MD;  Location: [facility] GI [facility]; Service: Gastroenterology    ROTATOR CUFF REPAIR Right     SHOULDER SURGERY Left        Current Outpatient Medications   Medication Sig Dispense Refill    ALPRAZolam (XANAX) 0 25 mg tablet take 1 tablet by mouth daily if needed at bedtime for anxiety 30 tablet 0    citalopram (CeleXA) 10 mg tablet Take 1 tablet (10 mg total) by mouth daily 30 tablet 5    fluticasone (FLONASE) 50 mcg/act nasal spray 2 sprays into each nostril daily 16 g 6    montelukast (SINGULAIR) 10 mg tablet take 1 tablet by mouth at bedtime 30 tablet 6     No current facility-administered medications for this visit  No Known Allergies    Review of Systems   Constitutional: Positive for chills  Negative for fever  HENT: Positive for postnasal drip and sore throat  Respiratory: Negative for cough and shortness of breath  Cardiovascular: Negative for chest pain  Gastrointestinal: Negative for abdominal pain, constipation, nausea and vomiting  Genitourinary: Negative for dysuria  Musculoskeletal: Negative for back pain  Skin: Negative for rash  Neurological: Positive for headaches  Negative for dizziness, weakness and light-headedness  Psychiatric/Behavioral: Negative for agitation         Video Exam    There were no vitals filed for this visit  Physical Exam  HENT:      Head: Normocephalic and atraumatic  Eyes:      General:         Right eye: No discharge  Left eye: No discharge  Conjunctiva/sclera: Conjunctivae normal    Neurological:      Mental Status: He is alert  Mental status is at baseline  Psychiatric:         Mood and Affect: Mood normal          Behavior: Behavior normal           I spent 10 minutes directly with the patient during this visit      VIRTUAL VISIT DISCLAIMER    Bailey Zamarripa acknowledges that he has consented to an online visit or consultation  He understands that the online visit is based solely on information provided by him, and that, in the absence of a face-to-face physical evaluation by the physician, the diagnosis he receives is both limited and provisional in terms of accuracy and completeness  This is not intended to replace a full medical face-to-face evaluation by the physician  Bailey Zamarripa understands and accepts these terms

## 2021-04-23 ENCOUNTER — TELEPHONE (OUTPATIENT)
Dept: FAMILY MEDICINE CLINIC | Facility: CLINIC | Age: 51
End: 2021-04-23

## 2021-04-23 DIAGNOSIS — R61 NIGHT SWEAT: ICD-10-CM

## 2021-04-23 DIAGNOSIS — R05.9 COUGH: Primary | ICD-10-CM

## 2021-04-23 DIAGNOSIS — R05.9 COUGH: ICD-10-CM

## 2021-04-23 PROCEDURE — U0005 INFEC AGEN DETEC AMPLI PROBE: HCPCS | Performed by: FAMILY MEDICINE

## 2021-04-23 PROCEDURE — U0003 INFECTIOUS AGENT DETECTION BY NUCLEIC ACID (DNA OR RNA); SEVERE ACUTE RESPIRATORY SYNDROME CORONAVIRUS 2 (SARS-COV-2) (CORONAVIRUS DISEASE [COVID-19]), AMPLIFIED PROBE TECHNIQUE, MAKING USE OF HIGH THROUGHPUT TECHNOLOGIES AS DESCRIBED BY CMS-2020-01-R: HCPCS | Performed by: FAMILY MEDICINE

## 2021-04-23 NOTE — TELEPHONE ENCOUNTER
Patient called back and was advised  Please place order in chart, nothing there yet  Patient will got [facility] in [place]

## 2021-04-23 NOTE — TELEPHONE ENCOUNTER
Dr Dante Webster ordered a covid test for pt pt was going to [facility] now for test

## 2021-04-23 NOTE — TELEPHONE ENCOUNTER
----- Message from Genevieve Garcia sent at 4/23/2021  7:49 AM EDT -----  Regarding: Visit Follow-Up Question  Contact: [phone]  Good Morning,  I had a FaceTime appointment yesterday  You had mentioned to let you know this morning if I was not feeling better so I can schedule the Covid test this morning  I am not feeling any better, in fact I had a couple  new symptoms  I now have a sore throat and the cough has gotten more consistent  I broke a sweat 3 time over night last night  Can I schedule a test at the [facility]?

## 2021-04-23 NOTE — TELEPHONE ENCOUNTER
Dr Sofía Woods    Since telemed visit yesterday, patient has worsening cough, sore throat, night sweat  Fever still    Please put in order for covid test

## 2021-04-25 LAB — SARS-COV-2 RNA RESP QL NAA+PROBE: POSITIVE

## 2021-04-26 ENCOUNTER — TELEMEDICINE (OUTPATIENT)
Dept: FAMILY MEDICINE CLINIC | Facility: CLINIC | Age: 51
End: 2021-04-26
Payer: COMMERCIAL

## 2021-04-26 ENCOUNTER — TELEPHONE (OUTPATIENT)
Dept: FAMILY MEDICINE CLINIC | Facility: CLINIC | Age: 51
End: 2021-04-26

## 2021-04-26 DIAGNOSIS — U07.1 COVID-19: Primary | ICD-10-CM

## 2021-04-26 PROCEDURE — 99213 OFFICE O/P EST LOW 20 MIN: CPT | Performed by: FAMILY MEDICINE

## 2021-04-26 NOTE — TELEPHONE ENCOUNTER
----- Message from Lashonda Kumar MD sent at 4/26/2021  9:11 AM EDT -----  Please email results to patient

## 2021-04-26 NOTE — PROGRESS NOTES
COVID-19 Outpatient Progress Note    Assessment/Plan:    Problem List Items Addressed This Visit     None      Visit Diagnoses     COVID-19    -  Primary         Disposition:     Quarantine at this time  Monitor for any worsening symptoms,  Precautions reviewed  Vitamin D + vitamin C + zinc     I have spent 10 minutes directly with the patient  Encounter provider Adela Pritchett MD    Provider located at [address] [place], [place] [phone]    Recent Visits  Date Type Provider Dept   04/23/21 Telephone [address] [facility]   04/22/21 [facility]/ Camilo Siegel  [facility] [address] recent visits within past 7 days and meeting all other requirements     Today's Visits  Date Type Provider Dept   04/26/21 Telemedicine Adela Pritchett  [address] today's visits and meeting all other requirements     Future Appointments  No visits were found meeting these conditions  Showing future appointments within next 150 days and meeting all other requirements      This virtual check-in was done via [employer] and patient was informed that this is a secure, HIPAA-compliant platform  He agrees to proceed  Patient agrees to participate in a virtual check in via telephone or video visit instead of presenting to the office to address urgent/immediate medical needs  Patient is aware this is a billable service  After connecting through [place], the patient was identified by name and date of birth  Linda Maddox was informed that this was a telemedicine visit and that the exam was being conducted confidentially over secure lines  My office door was closed  No one else was in the room  Linda Maddox acknowledged consent and understanding of privacy and security of the telemedicine visit   I informed the patient that I have reviewed his record in Epic and presented the opportunity for him to ask any questions regarding the visit today  The patient agreed to participate  Subjective:   Bailey Zamarripa is a 48 y o  male who has been screened for COVID-19  Symptom change since last report: improving  Patient's symptoms include fatigue, shortness of breath (with exertion ), nausea and headache  Patient denies fever, chills, sore throat, anosmia, loss of taste, cough, chest tightness, vomiting and diarrhea  Jose Miguel Marquez has been staying home and has isolated themselves in his home  He is taking care to not share personal items and     Cleaning all surfaces that are touched often?: is not      Wearing a mask when leaving room?: is not      Date of symptom onset: 4/21/2021  Date of positive COVID-19 PCR: 4/23/2021    Fever:   SOB: when going up the stairs  Body aches resolved  Fatigue is constant  Lab Results   Component Value Date    SARSCOV2 Positive (A) 04/23/2021    SARSCOV2 Not Detected 08/25/2020     Past Medical History:   Diagnosis Date    Anxiety     Back pain     Headache     Neck pain     Vertigo      Past Surgical History:   Procedure Laterality Date    APPENDECTOMY      CERVICAL FUSION N/A     HYDROCELE EXCISION / REPAIR      [place] COLONOSCOPY FLX DX W/COLLJ SPEC WHEN PFRMD N/A 4/9/2018    Procedure: COLONOSCOPY;  Surgeon: Adonay Odom MD;  Location: [place] [address] GI LAB; Service: Gastroenterology    ROTATOR CUFF REPAIR Right     SHOULDER SURGERY Left      Current Outpatient Medications   Medication Sig Dispense Refill    ALPRAZolam (XANAX) 0 25 mg tablet take 1 tablet by mouth daily if needed at bedtime for anxiety 30 tablet 0    citalopram (CeleXA) 10 mg tablet Take 1 tablet (10 mg total) by mouth daily 30 tablet 5    fluticasone (FLONASE) 50 mcg/act nasal spray 2 sprays into each nostril daily 16 g 6    montelukast (SINGULAIR) 10 mg tablet take 1 tablet by mouth at bedtime 30 tablet 6     No current facility-administered medications for this visit        No Known Allergies    Review of Systems Constitutional: Positive for fatigue  Negative for chills and fever  HENT: Negative for sore throat  Respiratory: Positive for shortness of breath (with exertion )  Negative for cough and chest tightness  Gastrointestinal: Positive for nausea  Negative for diarrhea and vomiting  Neurological: Positive for headaches  Objective: There were no vitals filed for this visit  Physical Exam  HENT:      Head: Normocephalic and atraumatic  Eyes:      General:         Right eye: No discharge  Left eye: No discharge  Conjunctiva/sclera: Conjunctivae normal    Pulmonary:      Effort: Pulmonary effort is normal    Neurological:      Mental Status: He is alert  Mental status is at baseline  Psychiatric:         Mood and Affect: Mood normal          Behavior: Behavior normal        VIRTUAL VISIT DISCLAIMER    Janelle Griggs acknowledges that he has consented to an online visit or consultation  He understands that the online visit is based solely on information provided by him, and that, in the absence of a face-to-face physical evaluation by the physician, the diagnosis he receives is both limited and provisional in terms of accuracy and completeness  This is not intended to replace a full medical face-to-face evaluation by the physician  Janelle Griggs understands and accepts these terms

## 2021-04-28 ENCOUNTER — TELEMEDICINE (OUTPATIENT)
Dept: FAMILY MEDICINE CLINIC | Facility: CLINIC | Age: 51
End: 2021-04-28
Payer: COMMERCIAL

## 2021-04-28 DIAGNOSIS — U07.1 COVID-19: Primary | ICD-10-CM

## 2021-04-28 PROCEDURE — 99212 OFFICE O/P EST SF 10 MIN: CPT | Performed by: FAMILY MEDICINE

## 2021-04-28 NOTE — PROGRESS NOTES
COVID-19 Outpatient Progress Note    Assessment/Plan:    Problem List Items Addressed This Visit     None      Visit Diagnoses     COVID-19    -  Primary         Disposition:     Currently on Day 8  Continue with quarantine  Monitor for any worsening symptoms  Precautions reviewed  I have spent 10 minutes directly with the patient  Encounter provider Radha Del Toro MD    Provider located at [address] [place] [phone]    Recent Visits  Date Type Provider Dept   04/26/21 Telephone Hafsa Pipe [facility]   04/26/21 [facility]/ Camilo Siegel MD [address] [facility]   04/23/21 Telephone [address] [facility]   04/22/21 Telemedicine Radha Del Toro  [address] recent visits within past 7 days and meeting all other requirements     Today's Visits  Date Type Provider Dept   04/28/21 Telemedicine Radha Del Toro  [address] today's visits and meeting all other requirements     Future Appointments  No visits were found meeting these conditions  Showing future appointments within next 150 days and meeting all other requirements      This virtual check-in was done via [email or web address] and patient was informed that this is a secure, HIPAA-compliant platform  He agrees to proceed  Patient agrees to participate in a virtual check in via telephone or video visit instead of presenting to the office to address urgent/immediate medical needs  Patient is aware this is a billable service  After connecting through [facility], the patient was identified by name and date of birth  Frankey Lincoln was informed that this was a telemedicine visit and that the exam was being conducted confidentially over secure lines  My office door was closed  No one else was in the room  Frankey Lincoln acknowledged consent and understanding of privacy and security of the telemedicine visit   I informed the patient that I have reviewed his record in [address] [place] [address] and presented the opportunity for him to ask any questions regarding the visit today  The patient agreed to participate  Subjective:   Isatu Bullock is a 48 y o  male who has been screened for COVID-19  Symptom change since last report: improving  Patient's symptoms include fatigue, nasal congestion and shortness of breath  Patient denies fever, chills, rhinorrhea, sore throat, anosmia, loss of taste, cough, chest tightness, abdominal pain, nausea, vomiting, diarrhea, myalgias and headaches  Frandy Rosario has been staying home and has isolated themselves in his home  He is taking care to not share personal items and is cleaning all surfaces that are touched often, like counters, tabletops, and doorknobs using household cleaning sprays or wipes  Wearing a mask when leaving room?: is not      Date of symptom onset: 4/21/2021  Date of positive COVID-19 PCR: 4/23/2021    Shortness of breath with exertion going up the stairs  Lab Results   Component Value Date    SARSCOV2 Positive (A) 04/23/2021    SARSCOV2 Not Detected 08/25/2020     Past Medical History:   Diagnosis Date    Anxiety     Back pain     Headache     Neck pain     Vertigo      Past Surgical History:   Procedure Laterality Date    APPENDECTOMY      CERVICAL FUSION N/A     HYDROCELE EXCISION / REPAIR      [place] COLONOSCOPY FLX DX W/COLLJ SPEC WHEN PFRMD N/A 4/9/2018    Procedure: COLONOSCOPY;  Surgeon: Gm Carson MD;  Location: [facility] GI LAB;   Service: Gastroenterology    ROTATOR CUFF REPAIR Right     SHOULDER SURGERY Left      Current Outpatient Medications   Medication Sig Dispense Refill    ALPRAZolam (XANAX) 0 25 mg tablet take 1 tablet by mouth daily if needed at bedtime for anxiety 30 tablet 0    citalopram (CeleXA) 10 mg tablet Take 1 tablet (10 mg total) by mouth daily 30 tablet 5    fluticasone (FLONASE) 50 mcg/act nasal spray 2 sprays into each nostril daily 16 g 6    montelukast (SINGULAIR) 10 mg tablet take 1 tablet by mouth at bedtime 30 tablet 6     No current facility-administered medications for this visit  No Known Allergies    Review of Systems   Constitutional: Positive for fatigue  Negative for chills and fever  HENT: Positive for congestion  Negative for rhinorrhea and sore throat  Respiratory: Positive for shortness of breath  Negative for cough and chest tightness  Gastrointestinal: Negative for abdominal pain, diarrhea, nausea and vomiting  Musculoskeletal: Negative for myalgias  Neurological: Negative for headaches  Objective: There were no vitals filed for this visit  Physical Exam  HENT:      Head: Normocephalic and atraumatic  Eyes:      General:         Right eye: No discharge  Left eye: No discharge  Conjunctiva/sclera: Conjunctivae normal    Pulmonary:      Effort: Pulmonary effort is normal    Neurological:      Mental Status: He is alert  Mental status is at baseline  Psychiatric:         Mood and Affect: Mood normal          Behavior: Behavior normal        VIRTUAL VISIT DISCLAIMER    Miko Hatfield acknowledges that he has consented to an online visit or consultation  He understands that the online visit is based solely on information provided by him, and that, in the absence of a face-to-face physical evaluation by the physician, the diagnosis he receives is both limited and provisional in terms of accuracy and completeness  This is not intended to replace a full medical face-to-face evaluation by the physician  Miko Hatfield understands and accepts these terms

## 2021-05-07 ENCOUNTER — OFFICE VISIT (OUTPATIENT)
Dept: FAMILY MEDICINE CLINIC | Facility: CLINIC | Age: 51
End: 2021-05-07
Payer: COMMERCIAL

## 2021-05-07 VITALS
RESPIRATION RATE: 16 BRPM | HEIGHT: 70 IN | SYSTOLIC BLOOD PRESSURE: 110 MMHG | DIASTOLIC BLOOD PRESSURE: 80 MMHG | WEIGHT: 186 LBS | TEMPERATURE: 98.2 F | HEART RATE: 78 BPM | BODY MASS INDEX: 26.63 KG/M2

## 2021-05-07 DIAGNOSIS — F41.9 ANXIETY: ICD-10-CM

## 2021-05-07 DIAGNOSIS — Z00.00 ROUTINE MEDICAL EXAM: Primary | ICD-10-CM

## 2021-05-07 DIAGNOSIS — F32.0 CURRENT MILD EPISODE OF MAJOR DEPRESSIVE DISORDER WITHOUT PRIOR EPISODE (HCC): ICD-10-CM

## 2021-05-07 PROCEDURE — 99396 PREV VISIT EST AGE 40-64: CPT | Performed by: FAMILY MEDICINE

## 2021-05-07 PROCEDURE — 1036F TOBACCO NON-USER: CPT | Performed by: FAMILY MEDICINE

## 2021-05-07 PROCEDURE — 3725F SCREEN DEPRESSION PERFORMED: CPT | Performed by: FAMILY MEDICINE

## 2021-05-07 PROCEDURE — 3008F BODY MASS INDEX DOCD: CPT | Performed by: FAMILY MEDICINE

## 2021-05-07 RX ORDER — DULOXETIN HYDROCHLORIDE 30 MG/1
30 CAPSULE, DELAYED RELEASE ORAL DAILY
Qty: 30 CAPSULE | Refills: 5 | Status: SHIPPED | OUTPATIENT
Start: 2021-05-07 | End: 2021-09-27 | Stop reason: SDUPTHER

## 2021-05-07 RX ORDER — ALPRAZOLAM 0.25 MG/1
0.25 TABLET ORAL
Qty: 30 TABLET | Refills: 0 | Status: SHIPPED | OUTPATIENT
Start: 2021-05-07 | End: 2021-09-10

## 2021-06-28 ENCOUNTER — OFFICE VISIT (OUTPATIENT)
Dept: FAMILY MEDICINE CLINIC | Facility: CLINIC | Age: 51
End: 2021-06-28
Payer: COMMERCIAL

## 2021-06-28 VITALS
TEMPERATURE: 97.7 F | BODY MASS INDEX: 27.35 KG/M2 | SYSTOLIC BLOOD PRESSURE: 102 MMHG | WEIGHT: 191 LBS | HEART RATE: 72 BPM | HEIGHT: 70 IN | DIASTOLIC BLOOD PRESSURE: 72 MMHG | RESPIRATION RATE: 14 BRPM

## 2021-06-28 DIAGNOSIS — F32.0 CURRENT MILD EPISODE OF MAJOR DEPRESSIVE DISORDER WITHOUT PRIOR EPISODE (HCC): Primary | ICD-10-CM

## 2021-06-28 DIAGNOSIS — F41.9 ANXIETY: ICD-10-CM

## 2021-06-28 PROCEDURE — 1036F TOBACCO NON-USER: CPT | Performed by: FAMILY MEDICINE

## 2021-06-28 PROCEDURE — 3008F BODY MASS INDEX DOCD: CPT | Performed by: FAMILY MEDICINE

## 2021-06-28 PROCEDURE — 99213 OFFICE O/P EST LOW 20 MIN: CPT | Performed by: FAMILY MEDICINE

## 2021-06-28 NOTE — PROGRESS NOTES
Chief Complaint   Patient presents with    Follow-up     depression        Patient ID: Wilda Costa is a 48 y o  male  HPI  Pt is seeing for f/u Depression  -  Started on Cymbalta 30 mg 6 + wks ago -  Doing much better -  Taking less Xanax as well     The following portions of the patient's history were reviewed and updated as appropriate: allergies, current medications, past family history, past medical history, past social history, past surgical history and problem list     Review of Systems   Constitutional: Negative  Respiratory: Negative  Cardiovascular: Negative  Gastrointestinal: Negative  Genitourinary: Negative  Musculoskeletal: Negative  Skin: Negative  Neurological: Negative  Current Outpatient Medications   Medication Sig Dispense Refill    ALPRAZolam (XANAX) 0 25 mg tablet Take 1 tablet (0 25 mg total) by mouth daily at bedtime as needed for anxiety 30 tablet 0    DULoxetine (CYMBALTA) 30 mg delayed release capsule Take 1 capsule (30 mg total) by mouth daily 30 capsule 5    fluticasone (FLONASE) 50 mcg/act nasal spray 2 sprays into each nostril daily 16 g 6     No current facility-administered medications for this visit  Objective:    /72 (BP Location: Left arm, Patient Position: Sitting, Cuff Size: Standard)   Pulse 72   Temp 97 7 °F (36 5 °C) (Temporal)   Resp 14   Ht 5' 10" (1 778 m)   Wt 86 6 kg (191 lb)   BMI 27 41 kg/m²        Physical Exam  Constitutional:       Appearance: He is not ill-appearing  Neurological:      General: No focal deficit present  Mental Status: He is alert and oriented to person, place, and time  Psychiatric:         Mood and Affect: Mood normal          Thought Content:  Thought content normal          Judgment: Judgment normal                  Assessment/Plan:         Diagnoses and all orders for this visit:    Current mild episode of major depressive disorder without prior episode ([facility] [address] )  Improved  Cont Cymbalta 30 mg   Anxiety  Improved       rto in 3 m                     Huma Medina MD

## 2021-09-10 DIAGNOSIS — F41.9 ANXIETY: ICD-10-CM

## 2021-09-10 RX ORDER — ALPRAZOLAM 0.25 MG/1
TABLET ORAL
Qty: 30 TABLET | Refills: 0 | Status: SHIPPED | OUTPATIENT
Start: 2021-09-10 | End: 2021-09-14 | Stop reason: SDUPTHER

## 2021-09-14 DIAGNOSIS — F41.9 ANXIETY: ICD-10-CM

## 2021-09-14 RX ORDER — ALPRAZOLAM 0.25 MG/1
0.25 TABLET ORAL
Qty: 30 TABLET | Refills: 0 | Status: SHIPPED | OUTPATIENT
Start: 2021-09-14 | End: 2022-01-26

## 2021-09-27 ENCOUNTER — OFFICE VISIT (OUTPATIENT)
Dept: FAMILY MEDICINE CLINIC | Facility: CLINIC | Age: 51
End: 2021-09-27
Payer: COMMERCIAL

## 2021-09-27 VITALS
HEIGHT: 70 IN | DIASTOLIC BLOOD PRESSURE: 50 MMHG | SYSTOLIC BLOOD PRESSURE: 120 MMHG | HEART RATE: 82 BPM | BODY MASS INDEX: 28.35 KG/M2 | TEMPERATURE: 98.2 F | RESPIRATION RATE: 16 BRPM | WEIGHT: 198 LBS

## 2021-09-27 DIAGNOSIS — Z23 NEED FOR INFLUENZA VACCINATION: ICD-10-CM

## 2021-09-27 DIAGNOSIS — F32.0 CURRENT MILD EPISODE OF MAJOR DEPRESSIVE DISORDER WITHOUT PRIOR EPISODE (HCC): Primary | ICD-10-CM

## 2021-09-27 PROCEDURE — 1036F TOBACCO NON-USER: CPT | Performed by: FAMILY MEDICINE

## 2021-09-27 PROCEDURE — 90682 RIV4 VACC RECOMBINANT DNA IM: CPT | Performed by: FAMILY MEDICINE

## 2021-09-27 PROCEDURE — 99213 OFFICE O/P EST LOW 20 MIN: CPT | Performed by: FAMILY MEDICINE

## 2021-09-27 PROCEDURE — 90471 IMMUNIZATION ADMIN: CPT | Performed by: FAMILY MEDICINE

## 2021-09-27 PROCEDURE — 3008F BODY MASS INDEX DOCD: CPT | Performed by: FAMILY MEDICINE

## 2021-09-27 RX ORDER — DULOXETIN HYDROCHLORIDE 60 MG/1
60 CAPSULE, DELAYED RELEASE ORAL DAILY
Qty: 30 CAPSULE | Refills: 6 | Status: SHIPPED | OUTPATIENT
Start: 2021-09-27 | End: 2022-02-21

## 2021-09-27 NOTE — PROGRESS NOTES
Chief Complaint   Patient presents with    Follow-up    Depression        Patient ID: Jl Avila is a 46 y o  male  HPI  Pt is seeing for f/u Depression -  Some improvement see on Cymbalta -  Still has days when he is not feeling well mentally  -  Xanax was helping to " take the edge "    The following portions of the patient's history were reviewed and updated as appropriate: allergies, current medications, past family history, past medical history, past social history, past surgical history and problem list     Review of Systems   Constitutional: Negative  Respiratory: Negative  Cardiovascular: Negative  Gastrointestinal: Negative  Psychiatric/Behavioral: Positive for dysphoric mood  Negative for sleep disturbance and suicidal ideas  The patient is nervous/anxious  Current Outpatient Medications   Medication Sig Dispense Refill    ALPRAZolam (XANAX) 0 25 mg tablet Take 1 tablet (0 25 mg total) by mouth daily at bedtime as needed for anxiety 30 tablet 0    DULoxetine (CYMBALTA) 30 mg delayed release capsule Take 1 capsule (30 mg total) by mouth daily 30 capsule 5    fluticasone (FLONASE) 50 mcg/act nasal spray 2 sprays into each nostril daily 16 g 6     No current facility-administered medications for this visit  Objective:    /50 (BP Location: Left arm, Patient Position: Sitting, Cuff Size: Standard)   Pulse 82   Temp 98 2 °F (36 8 °C) (Tympanic)   Resp 16   Ht 5' 10" (1 778 m)   Wt 89 8 kg (198 lb)   BMI 28 41 kg/m²        Physical Exam  Constitutional:       Appearance: He is not ill-appearing  Pulmonary:      Effort: Pulmonary effort is normal  No respiratory distress  Neurological:      General: No focal deficit present  Mental Status: He is alert and oriented to person, place, and time  Psychiatric:         Mood and Affect: Mood normal          Thought Content:  Thought content normal          Judgment: Judgment normal            Labs in chart were reviewed  Assessment/Plan:         Diagnoses and all orders for this visit:    Current mild episode of major depressive disorder without prior episode ([facility] [address] )  -  Will increase med from 30 mg to    DULoxetine (CYMBALTA) 60 mg delayed release capsule;  Take 1 capsule (60 mg total) by mouth daily    Need for influenza vaccination  -     FLUBLOK: influenza vaccine, quadrivalent, recombinant, PF, 0 5 mL            rto in 6 wks                 Rachell Cordova MD

## 2021-10-19 LAB
CHOLEST SERPL-MCNC: 230 MG/DL (ref 100–199)
HDLC SERPL-MCNC: 63 MG/DL
LDLC SERPL CALC-MCNC: 154 MG/DL (ref 0–99)
SL AMB VLDL CHOLESTEROL CALC: 13 MG/DL (ref 5–40)
TRIGL SERPL-MCNC: 77 MG/DL (ref 0–149)
TSH SERPL DL<=0.005 MIU/L-ACNC: 1.27 UIU/ML (ref 0.45–4.5)

## 2021-10-20 ENCOUNTER — TELEPHONE (OUTPATIENT)
Dept: FAMILY MEDICINE CLINIC | Facility: CLINIC | Age: 51
End: 2021-10-20

## 2021-11-23 DIAGNOSIS — F32.0 CURRENT MILD EPISODE OF MAJOR DEPRESSIVE DISORDER WITHOUT PRIOR EPISODE (HCC): ICD-10-CM

## 2021-11-23 RX ORDER — DULOXETIN HYDROCHLORIDE 30 MG/1
CAPSULE, DELAYED RELEASE ORAL
Qty: 30 CAPSULE | Refills: 5 | Status: SHIPPED | OUTPATIENT
Start: 2021-11-23 | End: 2022-07-11 | Stop reason: SDUPTHER

## 2022-01-26 DIAGNOSIS — F41.9 ANXIETY: ICD-10-CM

## 2022-01-26 RX ORDER — ALPRAZOLAM 0.25 MG/1
TABLET ORAL
Qty: 30 TABLET | Refills: 0 | Status: SHIPPED | OUTPATIENT
Start: 2022-01-26 | End: 2022-04-05

## 2022-02-21 ENCOUNTER — TELEMEDICINE (OUTPATIENT)
Dept: FAMILY MEDICINE CLINIC | Facility: CLINIC | Age: 52
End: 2022-02-21
Payer: COMMERCIAL

## 2022-02-21 DIAGNOSIS — R09.81 SINUS CONGESTION: Primary | ICD-10-CM

## 2022-02-21 PROCEDURE — 1036F TOBACCO NON-USER: CPT | Performed by: FAMILY MEDICINE

## 2022-02-21 PROCEDURE — 99213 OFFICE O/P EST LOW 20 MIN: CPT | Performed by: FAMILY MEDICINE

## 2022-02-21 RX ORDER — AMOXICILLIN AND CLAVULANATE POTASSIUM 875; 125 MG/1; MG/1
1 TABLET, FILM COATED ORAL EVERY 12 HOURS SCHEDULED
Qty: 14 TABLET | Refills: 0 | Status: SHIPPED | OUTPATIENT
Start: 2022-02-21 | End: 2022-02-28

## 2022-02-21 NOTE — PROGRESS NOTES
Chief Complaint   Patient presents with    Virtual Brief Visit    Sinus Problem        Patient ID: Berta Moses is a 46 y o  male  HPI  Pt is seeing for sinus congestion for 1 + wk, no therapy tried, fully vaccinated with COVID vaccines + booster     The following portions of the patient's history were reviewed and updated as appropriate: allergies, current medications, past family history, past medical history, past social history, past surgical history and problem list     Review of Systems   Constitutional: Negative  Negative for fatigue and fever  HENT: Positive for congestion, ear pain, postnasal drip, sinus pressure, sinus pain and sore throat  Negative for ear discharge  Respiratory: Negative  Negative for cough, shortness of breath and wheezing  Cardiovascular: Negative  Gastrointestinal: Negative  Current Outpatient Medications   Medication Sig Dispense Refill    ALPRAZolam (XANAX) 0 25 mg tablet TAKE 1 TABLET BY MOUTH DAILY AT BEDTIME AS NEEDED FOR ANXIETY 30 tablet 0    DULoxetine (CYMBALTA) 30 mg delayed release capsule take 1 capsule by mouth once daily 30 capsule 5    fluticasone (FLONASE) 50 mcg/act nasal spray 2 sprays into each nostril daily 16 g 6     No current facility-administered medications for this visit  Objective: There were no vitals taken for this visit  Physical Exam    Video connection did not work         Assessment/Plan:         Diagnoses and all orders for this visit:    Sinus congestion  -     amoxicillin-clavulanate (AUGMENTIN) 875-125 mg per tablet;  Take 1 tablet by mouth every 12 (twelve) hours for 7 days            rto prn             Richi Reeves MD

## 2022-04-04 DIAGNOSIS — F41.9 ANXIETY: ICD-10-CM

## 2022-04-05 RX ORDER — ALPRAZOLAM 0.25 MG/1
TABLET ORAL
Qty: 30 TABLET | Refills: 0 | Status: SHIPPED | OUTPATIENT
Start: 2022-04-05 | End: 2022-06-13

## 2022-06-13 DIAGNOSIS — F41.9 ANXIETY: ICD-10-CM

## 2022-06-13 RX ORDER — ALPRAZOLAM 0.25 MG/1
TABLET ORAL
Qty: 30 TABLET | Refills: 0 | Status: SHIPPED | OUTPATIENT
Start: 2022-06-13

## 2022-06-22 ENCOUNTER — OFFICE VISIT (OUTPATIENT)
Dept: FAMILY MEDICINE CLINIC | Facility: CLINIC | Age: 52
End: 2022-06-22
Payer: COMMERCIAL

## 2022-06-22 VITALS
HEIGHT: 70 IN | TEMPERATURE: 98.2 F | DIASTOLIC BLOOD PRESSURE: 78 MMHG | SYSTOLIC BLOOD PRESSURE: 110 MMHG | HEART RATE: 78 BPM | RESPIRATION RATE: 14 BRPM | WEIGHT: 186 LBS | BODY MASS INDEX: 26.63 KG/M2

## 2022-06-22 DIAGNOSIS — M54.41 CHRONIC RIGHT-SIDED LOW BACK PAIN WITH RIGHT-SIDED SCIATICA: ICD-10-CM

## 2022-06-22 DIAGNOSIS — G89.29 CHRONIC RIGHT-SIDED LOW BACK PAIN WITH RIGHT-SIDED SCIATICA: ICD-10-CM

## 2022-06-22 DIAGNOSIS — L73.9 FOLLICULITIS: Primary | ICD-10-CM

## 2022-06-22 PROCEDURE — 99213 OFFICE O/P EST LOW 20 MIN: CPT | Performed by: FAMILY MEDICINE

## 2022-06-22 PROCEDURE — 1036F TOBACCO NON-USER: CPT | Performed by: FAMILY MEDICINE

## 2022-06-22 PROCEDURE — 3008F BODY MASS INDEX DOCD: CPT | Performed by: FAMILY MEDICINE

## 2022-06-22 NOTE — PROGRESS NOTES
Chief Complaint   Patient presents with    Cyst     Cyst on buttock    Back Pain        Patient ID: Shavonne Menchaca is a 46 y o  male  HPI  Pt is seeing for worsening R sided low back pain with radiation to R leg - on and off for last 3-4 years -  Was seen by ortho in the past - laos has R buttock skin lump for several wks     The following portions of the patient's history were reviewed and updated as appropriate: allergies, current medications, past family history, past medical history, past social history, past surgical history and problem list     Review of Systems   Genitourinary: Negative  Neurological: Negative  All other systems reviewed and are negative  Current Outpatient Medications   Medication Sig Dispense Refill    ALPRAZolam (XANAX) 0 25 mg tablet TAKE 1 TABLET BY MOUTH DAILY AT BEDTIME AS NEEDED FOR ANXIETY 30 tablet 0    DULoxetine (CYMBALTA) 30 mg delayed release capsule take 1 capsule by mouth once daily 30 capsule 5    fluticasone (FLONASE) 50 mcg/act nasal spray 2 sprays into each nostril daily (Patient not taking: Reported on 6/22/2022) 16 g 6     No current facility-administered medications for this visit  Objective:    /78 (BP Location: Left arm, Patient Position: Sitting, Cuff Size: Standard)   Pulse 78   Temp 98 2 °F (36 8 °C) (Temporal)   Resp 14   Ht 5' 10" (1 778 m)   Wt 84 4 kg (186 lb)   BMI 26 69 kg/m²        Physical Exam  Constitutional:       Appearance: He is not ill-appearing  Musculoskeletal:         General: No swelling or tenderness  Right lower leg: No edema  Left lower leg: No edema  Skin:         Neurological:      Mental Status: He is alert  Assessment/Plan:         Diagnoses and all orders for this visit:    Folliculitis    Bacitracin OTC   Chronic right-sided low back pain with right-sided sciatica  -     Ambulatory Referral to Physical Therapy;  Future  -     Ambulatory Referral to Orthopedic Surgery; Future            BMI Counseling: Body mass index is 26 69 kg/m²  Discussed the patient's BMI with him  The BMI is above normal  Nutrition recommendations include reducing portion sizes, decreasing overall calorie intake, 3-5 servings of fruits/vegetables daily, reducing fast food intake and consuming healthier snacks  Exercise recommendations include exercising 3-5 times per week       rto hang Rojas MD

## 2022-08-16 DIAGNOSIS — F32.0 CURRENT MILD EPISODE OF MAJOR DEPRESSIVE DISORDER WITHOUT PRIOR EPISODE (HCC): ICD-10-CM

## 2022-08-16 RX ORDER — DULOXETIN HYDROCHLORIDE 30 MG/1
30 CAPSULE, DELAYED RELEASE ORAL DAILY
Qty: 90 CAPSULE | Refills: 1 | Status: SHIPPED | OUTPATIENT
Start: 2022-08-16

## 2022-09-02 DIAGNOSIS — F41.9 ANXIETY: ICD-10-CM

## 2022-09-02 RX ORDER — ALPRAZOLAM 0.25 MG/1
0.25 TABLET ORAL
Qty: 30 TABLET | Refills: 0 | Status: SHIPPED | OUTPATIENT
Start: 2022-09-02

## 2022-11-10 DIAGNOSIS — F41.9 ANXIETY: ICD-10-CM

## 2022-11-11 RX ORDER — ALPRAZOLAM 0.25 MG/1
0.25 TABLET ORAL
Qty: 30 TABLET | Refills: 0 | Status: SHIPPED | OUTPATIENT
Start: 2022-11-11

## 2022-11-20 DIAGNOSIS — F32.0 CURRENT MILD EPISODE OF MAJOR DEPRESSIVE DISORDER WITHOUT PRIOR EPISODE (HCC): ICD-10-CM

## 2022-11-21 RX ORDER — DULOXETIN HYDROCHLORIDE 30 MG/1
30 CAPSULE, DELAYED RELEASE ORAL DAILY
Qty: 90 CAPSULE | Refills: 0 | Status: SHIPPED | OUTPATIENT
Start: 2022-11-21

## 2022-12-15 ENCOUNTER — RA CDI HCC (OUTPATIENT)
Dept: OTHER | Facility: HOSPITAL | Age: 52
End: 2022-12-15

## 2022-12-15 NOTE — PROGRESS NOTES
[facility]  coding opportunities          Chart Reviewed number of suggestions sent to Provider: 1  [id number] [phone]     Patients Insurance        Commercial Insurance: [employer]

## 2022-12-22 ENCOUNTER — OFFICE VISIT (OUTPATIENT)
Dept: FAMILY MEDICINE CLINIC | Facility: CLINIC | Age: 52
End: 2022-12-22

## 2022-12-22 VITALS
WEIGHT: 191.8 LBS | SYSTOLIC BLOOD PRESSURE: 128 MMHG | TEMPERATURE: 97.8 F | BODY MASS INDEX: 27.46 KG/M2 | HEIGHT: 70 IN | RESPIRATION RATE: 14 BRPM | HEART RATE: 78 BPM | DIASTOLIC BLOOD PRESSURE: 86 MMHG

## 2022-12-22 DIAGNOSIS — F32.0 CURRENT MILD EPISODE OF MAJOR DEPRESSIVE DISORDER WITHOUT PRIOR EPISODE (HCC): ICD-10-CM

## 2022-12-22 DIAGNOSIS — Z00.00 ANNUAL PHYSICAL EXAM: Primary | ICD-10-CM

## 2022-12-22 DIAGNOSIS — F41.9 ANXIETY: ICD-10-CM

## 2022-12-22 DIAGNOSIS — Z23 NEED FOR INFLUENZA VACCINATION: ICD-10-CM

## 2022-12-22 RX ORDER — ARIPIPRAZOLE 5 MG/1
5 TABLET ORAL DAILY
Qty: 30 TABLET | Refills: 1 | Status: SHIPPED | OUTPATIENT
Start: 2022-12-22

## 2022-12-22 RX ORDER — BUSPIRONE HYDROCHLORIDE 5 MG/1
5 TABLET ORAL 2 TIMES DAILY
Qty: 60 TABLET | Refills: 5 | Status: SHIPPED | OUTPATIENT
Start: 2022-12-22

## 2022-12-22 NOTE — PROGRESS NOTES
Chief Complaint   Patient presents with   • Physical Exam     Physical/ constipation issues 1 month        Patient ID: Tammy Viera is a 46 y o  male  HPI  Pt is seeing for CPE     The following portions of the patient's history were reviewed and updated as appropriate: allergies, current medications, past family history, past medical history, past social history, past surgical history and problem list     Review of Systems   Constitutional: Negative for fatigue, fever and unexpected weight change  HENT: Negative for congestion, ear discharge, ear pain, hearing loss, rhinorrhea, sinus pressure, sore throat and trouble swallowing  Eyes: Negative  Respiratory: Negative  Cardiovascular: Negative  Gastrointestinal: Negative  Endocrine: Negative  Genitourinary: Negative  Musculoskeletal: Negative  Skin: Negative  Neurological: Negative for dizziness, weakness, light-headedness and numbness  Hematological: Negative  Psychiatric/Behavioral: Positive for decreased concentration, dysphoric mood and sleep disturbance  Negative for agitation, behavioral problems, confusion, hallucinations, self-injury and suicidal ideas  The patient is nervous/anxious  The patient is not hyperactive  Current Outpatient Medications   Medication Sig Dispense Refill   • ALPRAZolam (XANAX) 0 25 mg tablet Take 1 tablet (0 25 mg total) by mouth daily at bedtime as needed for anxiety 30 tablet 0   • DULoxetine (CYMBALTA) 30 mg delayed release capsule Take 1 capsule (30 mg total) by mouth daily 90 capsule 0   • fluticasone (FLONASE) 50 mcg/act nasal spray 2 sprays into each nostril daily (Patient not taking: Reported on 6/22/2022) 16 g 6     No current facility-administered medications for this visit         Objective:    /86 (BP Location: Right arm, Patient Position: Sitting, Cuff Size: Large)   Pulse 78   Temp 97 8 °F (36 6 °C) (Temporal)   Resp 14   Ht 5' 10" (1 778 m)   Wt 87 kg (191 lb 12 8 oz)   BMI 27 52 kg/m²        Physical Exam  Constitutional:       General: He is not in acute distress  Appearance: He is well-developed  He is not ill-appearing  HENT:      Head: Normocephalic and atraumatic  Right Ear: Hearing, tympanic membrane, ear canal and external ear normal       Left Ear: Hearing, tympanic membrane, ear canal and external ear normal       Nose: No congestion or rhinorrhea  Mouth/Throat:      Pharynx: No oropharyngeal exudate or posterior oropharyngeal erythema  Eyes:      Extraocular Movements: Extraocular movements intact  Conjunctiva/sclera: Conjunctivae normal    Neck:      Thyroid: No thyroid mass or thyromegaly  Vascular: No JVD  Cardiovascular:      Rate and Rhythm: Normal rate and regular rhythm  Heart sounds: Normal heart sounds  No murmur heard  No gallop  Pulmonary:      Effort: No respiratory distress  Breath sounds: Normal breath sounds  No wheezing, rhonchi or rales  Abdominal:      General: Bowel sounds are normal       Palpations: Abdomen is soft  Tenderness: There is no abdominal tenderness  Musculoskeletal:      Cervical back: Neck supple  Right lower leg: No edema  Left lower leg: No edema  Lymphadenopathy:      Cervical: No cervical adenopathy  Skin:     Coloration: Skin is not pale  Findings: No erythema or rash  Neurological:      General: No focal deficit present  Mental Status: He is alert and oriented to person, place, and time  Cranial Nerves: No cranial nerve deficit  Psychiatric:         Mood and Affect: Mood normal          Behavior: Behavior normal          Thought Content: Thought content normal          Judgment: Judgment normal            Labs in chart were reviewed  Assessment/Plan:         Diagnoses and all orders for this visit:    Annual physical exam  -     CBC; Future  -     Comprehensive metabolic panel; Future  -     Lipid panel;  Future  -     TSH, 3rd generation; Future  -     Hemoglobin A1C; Future    Need for influenza vaccination  -     influenza vaccine, quadrivalent, recombinant, PF, 0 5 mL, for patients 18 yr+ (FLUBLOK)    Current mild episode of major depressive disorder without prior episode (HCC)  -     ARIPiprazole (ABILIFY) 5 mg tablet; Take 1 tablet (5 mg total) by mouth daily  -     Ambulatory Referral to Psychiatry; Future    Anxiety  -     busPIRone (BUSPAR) 5 mg tablet; Take 1 tablet (5 mg total) by mouth 2 (two) times a day      rto in 1 m if cannot get to see psych yet     BMI Counseling: Body mass index is 27 52 kg/m²  Discussed the patient's BMI with him  The BMI is above normal  Nutrition recommendations include reducing portion sizes, decreasing overall calorie intake, 3-5 servings of fruits/vegetables daily, reducing fast food intake, consuming healthier snacks and decreasing soda and/or juice intake  Exercise recommendations include exercising 3-5 times per week                   Radha Denton MD

## 2022-12-27 ENCOUNTER — TELEPHONE (OUTPATIENT)
Dept: PSYCHIATRY | Facility: CLINIC | Age: 52
End: 2022-12-27

## 2022-12-27 NOTE — TELEPHONE ENCOUNTER
Spoke to pt who is looking med mgmt at [place] location  Sent information to Steven for [place] scheduling

## 2023-01-04 ENCOUNTER — TELEPHONE (OUTPATIENT)
Dept: PSYCHIATRY | Facility: CLINIC | Age: 53
End: 2023-01-04

## 2023-01-04 NOTE — TELEPHONE ENCOUNTER
Behavioral Health Outpatient Intake Questions    Referred By   : PCP    Please advise interviewee that they need to answer all questions truthfully to allow for best care, and any misrepresentations of information may affect their ability to be seen at this clinic   => Was this discussed? Yes     If Minor Child (under age 25)    Who is/are the legal guardian(s) of the child? Is there a custody agreement? No     • If "YES"- Custody orders must be obtained prior to scheduling the first appointment  • In addition, Consent to Treatment must be signed by all legal guardians prior to scheduling the first appointment    • If "NO"- Consent to Treatment must be signed by all legal guardians prior to scheduling the first appointment    Behavioral Health Outpatient Intake History -     Presenting Problem (in patient's own words): Anxiety, Depression, Lows and highs (Bipolar)     Are there any communication barriers for this patient? No                                               If yes, please describe barriers: NO  • If there is a unique situation, please refer to [address] [place] [address] for final determination  Are you taking any psychiatric medications? Yes   •   If "YES" -What are they Buspar, Abilify, Cymbalta, Xanax  •   If "YES" -Who prescribes? Has the Patient previously received outpatient Talk Therapy or Medication Management from [facility]  No     •    If "YES"- When, Where and with Whom? •    If "NO" -Has Patient received these services elsewhere? NO    •   If "YES" -When, Where, and with Whom? Has the Patient abused alcohol or other substances in the last 6 months ? No  No concerns of substance abuse are reported  •  If "YES" -What substance, How much, How often? •  If illegal substance: Refer to [facility] (for MARCOS) or [facility]    •  If Alcohol in excess of 10 drinks per week:  Refer to [facility] (for MARCOS) or [address] [place] [address] History-     Is this treatment court ordered? No   • If "Yes"- refer to [address] [place] [address] for final determination  Has the Patient been convicted of a felony? No  •  If "Yes" -When, What? • Talk Therapy : Send to [address] [place] [address] for final determination   • Med Management: Send to Dr Lyssa Leyva for final determination     ACCEPTED as a patient Yes  • If "Yes" Appointment Date: February 20, 2022 at 9:00am with [address]? No  • If “Yes” - (Where? Ex: [facility], [facility], [facility], etc )       Name of Insurance Co: [facility] ID# [id number]  [id number] Phone # [phone]  If ins is primary or secondary? If patient is a minor, parents information such as Name, D  O B of guarantor

## 2023-01-24 ENCOUNTER — OFFICE VISIT (OUTPATIENT)
Dept: FAMILY MEDICINE CLINIC | Facility: CLINIC | Age: 53
End: 2023-01-24

## 2023-01-24 VITALS
WEIGHT: 194.6 LBS | TEMPERATURE: 98.1 F | DIASTOLIC BLOOD PRESSURE: 72 MMHG | BODY MASS INDEX: 27.86 KG/M2 | RESPIRATION RATE: 16 BRPM | HEART RATE: 78 BPM | HEIGHT: 70 IN | SYSTOLIC BLOOD PRESSURE: 116 MMHG

## 2023-01-24 DIAGNOSIS — F41.9 ANXIETY: Primary | ICD-10-CM

## 2023-01-24 DIAGNOSIS — F32.0 CURRENT MILD EPISODE OF MAJOR DEPRESSIVE DISORDER WITHOUT PRIOR EPISODE (HCC): ICD-10-CM

## 2023-01-24 DIAGNOSIS — Z23 NEED FOR TDAP VACCINATION: ICD-10-CM

## 2023-01-24 RX ORDER — BUSPIRONE HYDROCHLORIDE 10 MG/1
10 TABLET ORAL 2 TIMES DAILY
Qty: 60 TABLET | Refills: 1 | Status: SHIPPED | OUTPATIENT
Start: 2023-01-24

## 2023-01-24 NOTE — PROGRESS NOTES
Chief Complaint   Patient presents with   • Follow-up     Med check         Patient ID: Thee Monahan is a 46 y o  male  HPI  Pt is seeing for f/u Anxiety and Depression -  Has an dima with psych in 1 m -  Abilify was added 1 m ago - depression symptoms are better -  Still feels very jittery -  On Buspar 5 mg BID  -  Sleeping better     The following portions of the patient's history were reviewed and updated as appropriate: allergies, current medications, past family history, past medical history, past social history, past surgical history and problem list     Review of Systems   Constitutional: Negative  Respiratory: Negative  Cardiovascular: Negative  Gastrointestinal: Negative  Genitourinary: Negative  Musculoskeletal: Negative  Skin: Negative  Neurological: Negative  Psychiatric/Behavioral: Positive for dysphoric mood  Negative for agitation, behavioral problems, self-injury, sleep disturbance and suicidal ideas  The patient is nervous/anxious  Current Outpatient Medications   Medication Sig Dispense Refill   • ALPRAZolam (XANAX) 0 25 mg tablet Take 1 tablet (0 25 mg total) by mouth daily at bedtime as needed for anxiety 30 tablet 0   • ARIPiprazole (ABILIFY) 5 mg tablet Take 1 tablet (5 mg total) by mouth daily 30 tablet 1   • busPIRone (BUSPAR) 5 mg tablet Take 1 tablet (5 mg total) by mouth 2 (two) times a day 60 tablet 5   • DULoxetine (CYMBALTA) 30 mg delayed release capsule Take 1 capsule (30 mg total) by mouth daily 90 capsule 0   • fluticasone (FLONASE) 50 mcg/act nasal spray 2 sprays into each nostril daily (Patient not taking: Reported on 6/22/2022) 16 g 6     No current facility-administered medications for this visit         Objective:    /72 (BP Location: Left arm, Patient Position: Sitting, Cuff Size: Large)   Pulse 78   Temp 98 1 °F (36 7 °C)   Resp 16   Ht 5' 10" (1 778 m)   Wt 88 3 kg (194 lb 9 6 oz)   BMI 27 92 kg/m²        Physical Exam  Constitutional:       Appearance: He is not ill-appearing  Cardiovascular:      Rate and Rhythm: Normal rate  Pulmonary:      Effort: Pulmonary effort is normal  No respiratory distress  Neurological:      General: No focal deficit present  Mental Status: He is alert and oriented to person, place, and time  Psychiatric:         Mood and Affect: Mood normal          Thought Content: Thought content normal          Judgment: Judgment normal                  Assessment/Plan:         Diagnoses and all orders for this visit:    Anxiety  -   Will increase med from 5 mg BID to   busPIRone (BUSPAR) 10 mg tablet;  Take 1 tablet (10 mg total) by mouth 2 (two) times a day    Need for Tdap vaccination  -     TDAP VACCINE GREATER THAN OR EQUAL TO 8YO IM    Current mild episode of major depressive disorder without prior episode ([facility] [address] )      improved  Cont with Cymbalta and Abilify      rto in 3 m                 Celina Gerardo MD

## 2023-01-29 DIAGNOSIS — F41.9 ANXIETY: ICD-10-CM

## 2023-01-30 RX ORDER — ALPRAZOLAM 0.25 MG/1
TABLET ORAL
Qty: 30 TABLET | Refills: 0 | Status: SHIPPED | OUTPATIENT
Start: 2023-01-30

## 2023-02-18 ENCOUNTER — TELEPHONE (OUTPATIENT)
Dept: OTHER | Facility: OTHER | Age: 53
End: 2023-02-18

## 2023-02-18 NOTE — TELEPHONE ENCOUNTER
Patient is calling regarding cancelling an appointment      Date/Time:2/20/23 @ 9 am    Patient was rescheduled: YES [] NO [x]    Patient requesting call back to reschedule: YES [x] NO []

## 2023-02-22 DIAGNOSIS — F32.0 CURRENT MILD EPISODE OF MAJOR DEPRESSIVE DISORDER WITHOUT PRIOR EPISODE (HCC): ICD-10-CM

## 2023-02-22 RX ORDER — DULOXETIN HYDROCHLORIDE 30 MG/1
30 CAPSULE, DELAYED RELEASE ORAL DAILY
Qty: 90 CAPSULE | Refills: 0 | Status: SHIPPED | OUTPATIENT
Start: 2023-02-22 | End: 2023-02-23 | Stop reason: SDUPTHER

## 2023-02-23 DIAGNOSIS — F32.0 CURRENT MILD EPISODE OF MAJOR DEPRESSIVE DISORDER WITHOUT PRIOR EPISODE (HCC): ICD-10-CM

## 2023-02-23 RX ORDER — DULOXETIN HYDROCHLORIDE 30 MG/1
30 CAPSULE, DELAYED RELEASE ORAL DAILY
Qty: 90 CAPSULE | Refills: 0 | Status: SHIPPED | OUTPATIENT
Start: 2023-02-23

## 2023-02-23 RX ORDER — ARIPIPRAZOLE 5 MG/1
TABLET ORAL
Qty: 30 TABLET | Refills: 1 | Status: SHIPPED | OUTPATIENT
Start: 2023-02-23

## 2023-04-24 ENCOUNTER — OFFICE VISIT (OUTPATIENT)
Dept: FAMILY MEDICINE CLINIC | Facility: CLINIC | Age: 53
End: 2023-04-24

## 2023-04-24 VITALS
HEIGHT: 70 IN | SYSTOLIC BLOOD PRESSURE: 104 MMHG | BODY MASS INDEX: 29.15 KG/M2 | DIASTOLIC BLOOD PRESSURE: 80 MMHG | HEART RATE: 72 BPM | WEIGHT: 203.6 LBS | TEMPERATURE: 98.7 F | RESPIRATION RATE: 16 BRPM

## 2023-04-24 DIAGNOSIS — F41.9 ANXIETY: ICD-10-CM

## 2023-04-24 DIAGNOSIS — F32.0 CURRENT MILD EPISODE OF MAJOR DEPRESSIVE DISORDER WITHOUT PRIOR EPISODE (HCC): Primary | ICD-10-CM

## 2023-04-24 DIAGNOSIS — L72.9 CYST OF SKIN: ICD-10-CM

## 2023-04-24 RX ORDER — BUSPIRONE HYDROCHLORIDE 10 MG/1
10 TABLET ORAL 3 TIMES DAILY
Qty: 270 TABLET | Refills: 0
Start: 2023-04-24 | End: 2023-07-23

## 2023-04-24 NOTE — PROGRESS NOTES
"Chief Complaint   Patient presents with   • Follow-up     Medication f/u    • Anxiety   • Depression        Patient ID: Yomi Miguel is a 46 y o  male  HPI  Pt is seeing for f/u Anxiety and Depression -  Doing well \" for the most part\" -  Taking xanax as needed for sleep -  30 pills are lasting 2+ months -  On Buspar 2 times a day -  Feeling \"antsy\" during the day     The following portions of the patient's history were reviewed and updated as appropriate: allergies, current medications, past family history, past medical history, past social history, past surgical history and problem list     Review of Systems   Constitutional: Negative  Respiratory: Negative  Cardiovascular: Negative  Gastrointestinal: Negative  Genitourinary: Negative  Musculoskeletal: Negative  Skin: Negative  Neurological: Negative  Psychiatric/Behavioral: Positive for sleep disturbance  Negative for agitation, behavioral problems, decreased concentration, dysphoric mood and suicidal ideas  The patient is not nervous/anxious  Current Outpatient Medications   Medication Sig Dispense Refill   • ALPRAZolam (XANAX) 0 25 mg tablet Take 1 tablet (0 25 mg total) by mouth daily as needed for anxiety 30 tablet 0   • ARIPiprazole (ABILIFY) 5 mg tablet Take 1 tablet (5 mg total) by mouth daily 90 tablet 1   • busPIRone (BUSPAR) 10 mg tablet Take 1 tablet (10 mg total) by mouth 2 (two) times a day 180 tablet 0   • DULoxetine (CYMBALTA) 30 mg delayed release capsule Take 1 capsule (30 mg total) by mouth daily 90 capsule 0   • fluticasone (FLONASE) 50 mcg/act nasal spray 2 sprays into each nostril daily (Patient not taking: Reported on 6/22/2022) 16 g 6     No current facility-administered medications for this visit         Objective:    /80 (BP Location: Left arm, Patient Position: Sitting, Cuff Size: Large)   Pulse 72   Temp 98 7 °F (37 1 °C)   Resp 16   Ht 5' 10\" (1 778 m)   Wt 92 4 kg (203 lb 9 6 oz)   BMI " 29 21 kg/m²        Physical Exam  Constitutional:       General: He is not in acute distress  Appearance: He is not ill-appearing  Cardiovascular:      Rate and Rhythm: Normal rate  Pulmonary:      Effort: Pulmonary effort is normal  No respiratory distress  Neurological:      General: No focal deficit present  Mental Status: He is alert and oriented to person, place, and time  Psychiatric:         Mood and Affect: Mood normal          Thought Content: Thought content normal          Judgment: Judgment normal            Labs in chart were reviewed  Assessment/Plan:         Diagnoses and all orders for this visit:    Current mild episode of major depressive disorder without prior episode ([facility] [address] )  In remission -  Cont meds  Anxiety  -    Will increase med from BID to busPIRone (BUSPAR) 10 mg tablet; Take 1 tablet (10 mg total) by mouth 3 (three) times a day    Cyst of skin  -     Ambulatory Referral to General Surgery;  Future          rto in 6 m                 Jessica Herring MD

## 2023-05-16 DIAGNOSIS — F32.0 CURRENT MILD EPISODE OF MAJOR DEPRESSIVE DISORDER WITHOUT PRIOR EPISODE (HCC): ICD-10-CM

## 2023-05-16 RX ORDER — DULOXETIN HYDROCHLORIDE 30 MG/1
CAPSULE, DELAYED RELEASE ORAL
Qty: 90 CAPSULE | Refills: 0 | Status: SHIPPED | OUTPATIENT
Start: 2023-05-16

## 2023-05-28 DIAGNOSIS — F41.9 ANXIETY: ICD-10-CM

## 2023-05-30 RX ORDER — BUSPIRONE HYDROCHLORIDE 10 MG/1
TABLET ORAL
Qty: 180 TABLET | Refills: 0 | Status: SHIPPED | OUTPATIENT
Start: 2023-05-30

## 2023-06-13 DIAGNOSIS — F41.9 ANXIETY: ICD-10-CM

## 2023-06-13 RX ORDER — BUSPIRONE HYDROCHLORIDE 10 MG/1
10 TABLET ORAL 2 TIMES DAILY
Qty: 180 TABLET | Refills: 0 | Status: SHIPPED | OUTPATIENT
Start: 2023-06-13

## 2023-06-21 ENCOUNTER — APPOINTMENT (OUTPATIENT)
Dept: RADIOLOGY | Facility: CLINIC | Age: 53
End: 2023-06-21
Payer: COMMERCIAL

## 2023-06-21 ENCOUNTER — OFFICE VISIT (OUTPATIENT)
Dept: FAMILY MEDICINE CLINIC | Facility: CLINIC | Age: 53
End: 2023-06-21
Payer: COMMERCIAL

## 2023-06-21 VITALS
BODY MASS INDEX: 29.2 KG/M2 | DIASTOLIC BLOOD PRESSURE: 70 MMHG | WEIGHT: 204 LBS | HEART RATE: 68 BPM | HEIGHT: 70 IN | RESPIRATION RATE: 14 BRPM | SYSTOLIC BLOOD PRESSURE: 102 MMHG | TEMPERATURE: 98.3 F | OXYGEN SATURATION: 98 %

## 2023-06-21 DIAGNOSIS — R06.2 WHEEZE: ICD-10-CM

## 2023-06-21 DIAGNOSIS — R05.1 ACUTE COUGH: Primary | ICD-10-CM

## 2023-06-21 DIAGNOSIS — R05.1 ACUTE COUGH: ICD-10-CM

## 2023-06-21 PROCEDURE — 99213 OFFICE O/P EST LOW 20 MIN: CPT | Performed by: FAMILY MEDICINE

## 2023-06-21 PROCEDURE — 71046 X-RAY EXAM CHEST 2 VIEWS: CPT

## 2023-06-21 RX ORDER — AZITHROMYCIN 250 MG/1
TABLET, FILM COATED ORAL
Qty: 6 TABLET | Refills: 0 | Status: SHIPPED | OUTPATIENT
Start: 2023-06-21 | End: 2023-06-26

## 2023-06-21 RX ORDER — PREDNISONE 20 MG/1
40 TABLET ORAL DAILY
Qty: 10 TABLET | Refills: 0 | Status: SHIPPED | OUTPATIENT
Start: 2023-06-21 | End: 2023-06-26

## 2023-06-21 NOTE — LETTER
June 21, 2023     Patient: Steff Fenton  YOB: 1970  Date of Visit: 6/21/2023      To Whom it May Concern:    Margarito Kathleen is under my professional care  Scott Mason was seen in my office on 6/21/2023  Scottbrenda Mason may return to work on 6/22/2022  Please excuse work absence 6/21/2023 due to illness  If you have any questions or concerns, please don't hesitate to call           Sincerely,          Amarjit Ramirez MD

## 2023-06-23 DIAGNOSIS — F32.0 CURRENT MILD EPISODE OF MAJOR DEPRESSIVE DISORDER WITHOUT PRIOR EPISODE (HCC): ICD-10-CM

## 2023-06-23 NOTE — PROGRESS NOTES
Assessment/Plan:    1  Acute cough  -     azithromycin (Zithromax) 250 mg tablet; Take 2 tablets (500 mg total) by mouth daily for 1 day, THEN 1 tablet (250 mg total) daily for 4 days  -     predniSONE 20 mg tablet; Take 2 tablets (40 mg total) by mouth daily for 5 days  -     XR chest pa & lateral; Future; Expected date: 06/21/2023    2  Wheeze  -     azithromycin (Zithromax) 250 mg tablet; Take 2 tablets (500 mg total) by mouth daily for 1 day, THEN 1 tablet (250 mg total) daily for 4 days  -     predniSONE 20 mg tablet; Take 2 tablets (40 mg total) by mouth daily for 5 days  -     XR chest pa & lateral; Future; Expected date: 06/21/2023          Subjective:      Patient ID: Dominga Eason is a 46 y o  male  Chief Complaint   Patient presents with   • Cold Like Symptoms     Pt states has had chest cold/cough x2 weeks       Cough  This is a new problem  The current episode started 1 to 4 weeks ago  The problem has been gradually worsening  The cough is productive of sputum  Associated symptoms include heartburn, nasal congestion, postnasal drip and wheezing  Pertinent negatives include no fever, hemoptysis or rash  Risk factors for lung disease include travel  His past medical history is significant for bronchitis and environmental allergies  recently returned from [place], [place]  No known sick contacts; no one else in family or on trip w same sxs    The following portions of the patient's history were reviewed and updated as appropriate: allergies, current medications, past family history, past medical history, past social history, past surgical history and problem list     Review of Systems   Constitutional: Negative for fever  HENT: Positive for postnasal drip  Respiratory: Positive for cough and wheezing  Negative for hemoptysis  Gastrointestinal: Positive for heartburn  Skin: Negative for rash  Allergic/Immunologic: Positive for environmental allergies           Current Outpatient "Medications   Medication Sig Dispense Refill   • ALPRAZolam (XANAX) 0 25 mg tablet Take 1 tablet (0 25 mg total) by mouth daily as needed for anxiety 30 tablet 0   • ARIPiprazole (ABILIFY) 5 mg tablet Take 1 tablet (5 mg total) by mouth daily 90 tablet 1   • azithromycin (Zithromax) 250 mg tablet Take 2 tablets (500 mg total) by mouth daily for 1 day, THEN 1 tablet (250 mg total) daily for 4 days  6 tablet 0   • busPIRone (BUSPAR) 10 mg tablet Take 1 tablet (10 mg total) by mouth 2 (two) times a day 180 tablet 0   • DULoxetine (CYMBALTA) 30 mg delayed release capsule take 1 capsule by mouth once daily 90 capsule 0   • predniSONE 20 mg tablet Take 2 tablets (40 mg total) by mouth daily for 5 days 10 tablet 0     No current facility-administered medications for this visit  Objective:    /70 (BP Location: Left arm, Patient Position: Sitting, Cuff Size: Adult)   Pulse 68   Temp 98 3 °F (36 8 °C) (Temporal)   Resp 14   Ht 5' 10\" (1 778 m)   Wt 92 5 kg (204 lb)   SpO2 98%   BMI 29 27 kg/m²        Physical Exam  Vitals and nursing note reviewed  Constitutional:       General: He is not in acute distress  HENT:      Nose: Congestion present  Mouth/Throat:      Pharynx: Posterior oropharyngeal erythema present  No oropharyngeal exudate  Eyes:      General: No scleral icterus  Conjunctiva/sclera: Conjunctivae normal    Cardiovascular:      Rate and Rhythm: Normal rate and regular rhythm  Pulmonary:      Effort: Pulmonary effort is normal  No respiratory distress  Comments: LLL wheeze  Abdominal:      General: Bowel sounds are normal       Palpations: Abdomen is soft  Tenderness: There is no abdominal tenderness  Musculoskeletal:      Cervical back: Neck supple  Skin:     General: Skin is warm and dry  Findings: No rash  Neurological:      Mental Status: He is alert and oriented to person, place, and time           Bernabe Waters MD  "

## 2023-06-26 RX ORDER — ARIPIPRAZOLE 5 MG/1
5 TABLET ORAL DAILY
Qty: 90 TABLET | Refills: 0 | Status: SHIPPED | OUTPATIENT
Start: 2023-06-26

## 2023-06-30 ENCOUNTER — OFFICE VISIT (OUTPATIENT)
Dept: FAMILY MEDICINE CLINIC | Facility: CLINIC | Age: 53
End: 2023-06-30
Payer: COMMERCIAL

## 2023-06-30 VITALS
OXYGEN SATURATION: 98 % | BODY MASS INDEX: 28.95 KG/M2 | DIASTOLIC BLOOD PRESSURE: 80 MMHG | TEMPERATURE: 98.4 F | RESPIRATION RATE: 16 BRPM | SYSTOLIC BLOOD PRESSURE: 110 MMHG | WEIGHT: 202.2 LBS | HEIGHT: 70 IN | HEART RATE: 84 BPM

## 2023-06-30 DIAGNOSIS — R05.1 ACUTE COUGH: Primary | ICD-10-CM

## 2023-06-30 DIAGNOSIS — Z30.09 VASECTOMY EVALUATION: ICD-10-CM

## 2023-06-30 DIAGNOSIS — R06.2 WHEEZE: ICD-10-CM

## 2023-06-30 PROCEDURE — 99213 OFFICE O/P EST LOW 20 MIN: CPT | Performed by: FAMILY MEDICINE

## 2023-06-30 RX ORDER — ALBUTEROL SULFATE 90 UG/1
2 AEROSOL, METERED RESPIRATORY (INHALATION) EVERY 4 HOURS PRN
Qty: 6.7 G | Refills: 0 | Status: SHIPPED | OUTPATIENT
Start: 2023-06-30

## 2023-06-30 RX ORDER — LEVOFLOXACIN 500 MG/1
500 TABLET, FILM COATED ORAL EVERY 24 HOURS
Qty: 7 TABLET | Refills: 0 | Status: SHIPPED | OUTPATIENT
Start: 2023-06-30 | End: 2023-07-07

## 2023-06-30 NOTE — PROGRESS NOTES
Chief Complaint   Patient presents with   • Follow-up     1 week f/u cough and chest congestion        Patient ID: Marisela Quach is a 46 y o  male  Cough  This is a new problem  The current episode started more than 1 month ago  The problem has been gradually improving  The problem occurs every few hours  The cough is non-productive  Associated symptoms include nasal congestion and wheezing  Pertinent negatives include no chest pain, chills, ear congestion, ear pain, fever, headaches, heartburn, hemoptysis, myalgias, postnasal drip, rash, rhinorrhea, sore throat, shortness of breath, sweats or weight loss  Nothing aggravates the symptoms  He has tried oral steroids and OTC cough suppressant (Zpack) for the symptoms  The treatment provided mild relief  There is no history of asthma, bronchiectasis, bronchitis, COPD, emphysema, environmental allergies or pneumonia  The following portions of the patient's history were reviewed and updated as appropriate: allergies, current medications, past family history, past medical history, past social history, past surgical history and problem list     Review of Systems   Constitutional: Negative for chills, fever and weight loss  HENT: Negative for ear pain, postnasal drip, rhinorrhea and sore throat  Respiratory: Positive for cough and wheezing  Negative for hemoptysis and shortness of breath  Cardiovascular: Negative for chest pain  Gastrointestinal: Negative  Negative for heartburn  Musculoskeletal: Negative for myalgias  Skin: Negative for rash  Allergic/Immunologic: Negative for environmental allergies  Neurological: Negative for headaches         Current Outpatient Medications   Medication Sig Dispense Refill   • ALPRAZolam (XANAX) 0 25 mg tablet Take 1 tablet (0 25 mg total) by mouth daily as needed for anxiety 30 tablet 0   • ARIPiprazole (ABILIFY) 5 mg tablet Take 1 tablet (5 mg total) by mouth daily 90 tablet 0   • busPIRone (BUSPAR) 10 mg "tablet Take 1 tablet (10 mg total) by mouth 2 (two) times a day 180 tablet 0   • DULoxetine (CYMBALTA) 30 mg delayed release capsule take 1 capsule by mouth once daily 90 capsule 0     No current facility-administered medications for this visit  Objective:    /80 (BP Location: Left arm, Patient Position: Sitting, Cuff Size: Large)   Pulse 84   Temp 98 4 °F (36 9 °C)   Resp 16   Ht 5' 10\" (1 778 m)   Wt 91 7 kg (202 lb 3 2 oz)   SpO2 98%   BMI 29 01 kg/m²        Physical Exam  Constitutional:       Appearance: He is not ill-appearing  HENT:      Right Ear: Tympanic membrane normal       Left Ear: Tympanic membrane normal       Nose: No congestion or rhinorrhea  Mouth/Throat:      Pharynx: No oropharyngeal exudate or posterior oropharyngeal erythema  Cardiovascular:      Rate and Rhythm: Normal rate and regular rhythm  Pulmonary:      Effort: Pulmonary effort is normal  No respiratory distress  Breath sounds: No wheezing, rhonchi or rales  Neurological:      Mental Status: He is alert  Assessment/Plan:         Diagnoses and all orders for this visit:    Acute cough  -     levofloxacin (LEVAQUIN) 500 mg tablet; Take 1 tablet (500 mg total) by mouth every 24 hours for 7 days  -     albuterol (ProAir HFA) 90 mcg/act inhaler; Inhale 2 puffs every 4 (four) hours as needed for wheezing    Wheeze  -     albuterol (ProAir HFA) 90 mcg/act inhaler; Inhale 2 puffs every 4 (four) hours as needed for wheezing    Vasectomy evaluation  -     Ambulatory Referral to Urology;  Future            rto prn                 Vic Tidwell MD      "

## 2023-07-10 DIAGNOSIS — F41.9 ANXIETY: ICD-10-CM

## 2023-07-10 RX ORDER — ALPRAZOLAM 0.25 MG/1
0.25 TABLET ORAL DAILY PRN
Qty: 30 TABLET | Refills: 0 | Status: SHIPPED | OUTPATIENT
Start: 2023-07-10

## 2023-08-14 DIAGNOSIS — F32.0 CURRENT MILD EPISODE OF MAJOR DEPRESSIVE DISORDER WITHOUT PRIOR EPISODE (HCC): ICD-10-CM

## 2023-08-14 RX ORDER — DULOXETIN HYDROCHLORIDE 30 MG/1
CAPSULE, DELAYED RELEASE ORAL
Qty: 90 CAPSULE | Refills: 0 | Status: SHIPPED | OUTPATIENT
Start: 2023-08-14 | End: 2023-08-25 | Stop reason: SDUPTHER

## 2023-08-20 PROBLEM — R05.1 ACUTE COUGH: Status: RESOLVED | Noted: 2023-06-21 | Resolved: 2023-08-20

## 2023-08-21 ENCOUNTER — OFFICE VISIT (OUTPATIENT)
Dept: FAMILY MEDICINE CLINIC | Facility: CLINIC | Age: 53
End: 2023-08-21
Payer: COMMERCIAL

## 2023-08-21 VITALS
HEART RATE: 70 BPM | DIASTOLIC BLOOD PRESSURE: 94 MMHG | BODY MASS INDEX: 28.98 KG/M2 | HEIGHT: 70 IN | SYSTOLIC BLOOD PRESSURE: 134 MMHG | WEIGHT: 202.4 LBS | RESPIRATION RATE: 16 BRPM | TEMPERATURE: 98.7 F

## 2023-08-21 DIAGNOSIS — S93.601A FOOT SPRAIN, RIGHT, INITIAL ENCOUNTER: Primary | ICD-10-CM

## 2023-08-21 PROCEDURE — 99213 OFFICE O/P EST LOW 20 MIN: CPT | Performed by: FAMILY MEDICINE

## 2023-08-21 NOTE — PROGRESS NOTES
Chief Complaint   Patient presents with   • Foot Swelling     Patient complains of of rolling foot x 2 days ago and its swollen and bruised         Patient ID: Kendy Gore is a 48 y.o. male. HPI  Pt is seeing for R foot bruise and pain after pt sprained R foot 2 days ago ( inverted )  -  Able to bear weight -  Swelling is improving with ice compresses     The following portions of the patient's history were reviewed and updated as appropriate: allergies, current medications, past family history, past medical history, past social history, past surgical history and problem list.    Review of Systems   All other systems reviewed and are negative. Current Outpatient Medications   Medication Sig Dispense Refill   • ALPRAZolam (XANAX) 0.25 mg tablet Take 1 tablet (0.25 mg total) by mouth daily as needed for anxiety 30 tablet 0   • ARIPiprazole (ABILIFY) 5 mg tablet Take 1 tablet (5 mg total) by mouth daily 90 tablet 0   • busPIRone (BUSPAR) 10 mg tablet Take 1 tablet (10 mg total) by mouth 2 (two) times a day 180 tablet 0   • DULoxetine (CYMBALTA) 30 mg delayed release capsule take 1 capsule by mouth once daily 90 capsule 0   • albuterol (ProAir HFA) 90 mcg/act inhaler Inhale 2 puffs every 4 (four) hours as needed for wheezing (Patient not taking: Reported on 8/21/2023) 6.7 g 0   • ARIPiprazole (ABILIFY) 5 mg tablet Take 1 tablet (5 mg total) by mouth daily 90 tablet 0     No current facility-administered medications for this visit. Objective:    /94 (BP Location: Left arm, Patient Position: Sitting, Cuff Size: Standard)   Pulse 70   Temp 98.7 °F (37.1 °C)   Resp 16   Ht 5' 10" (1.778 m)   Wt 91.8 kg (202 lb 6.4 oz)   BMI 29.04 kg/m²        Physical Exam      Physical Exam  Constitutional:       Appearance: He is not ill-appearing. Musculoskeletal:         General: Swelling and tenderness present. Left lower leg: No edema. Right ankle: Normal. No swelling or deformity.  No tenderness. Normal range of motion. Right foot: Normal range of motion. Swelling present. No deformity, tenderness or bony tenderness. Legs:    Skin:     Findings: Bruising (R foot lateral aspect ) present. Neurological:      Mental Status: He is alert.            Assessment/Plan:         Diagnoses and all orders for this visit:    Foot sprain, right, initial encounter      ice, elevated rest    rto prn                     Susette Dancer, MD

## 2023-08-25 DIAGNOSIS — F32.0 CURRENT MILD EPISODE OF MAJOR DEPRESSIVE DISORDER WITHOUT PRIOR EPISODE (HCC): ICD-10-CM

## 2023-08-25 RX ORDER — DULOXETIN HYDROCHLORIDE 30 MG/1
30 CAPSULE, DELAYED RELEASE ORAL DAILY
Qty: 90 CAPSULE | Refills: 0 | Status: SHIPPED | OUTPATIENT
Start: 2023-08-25

## 2023-09-21 DIAGNOSIS — F41.9 ANXIETY: ICD-10-CM

## 2023-09-22 RX ORDER — BUSPIRONE HYDROCHLORIDE 10 MG/1
10 TABLET ORAL 2 TIMES DAILY
Qty: 180 TABLET | Refills: 0 | Status: SHIPPED | OUTPATIENT
Start: 2023-09-22

## 2023-09-28 DIAGNOSIS — F41.9 ANXIETY: ICD-10-CM

## 2023-09-29 RX ORDER — ALPRAZOLAM 0.25 MG/1
0.25 TABLET ORAL DAILY PRN
Qty: 30 TABLET | Refills: 0 | Status: SHIPPED | OUTPATIENT
Start: 2023-09-29

## 2023-10-06 DIAGNOSIS — F32.0 CURRENT MILD EPISODE OF MAJOR DEPRESSIVE DISORDER WITHOUT PRIOR EPISODE (HCC): ICD-10-CM

## 2023-10-07 RX ORDER — ARIPIPRAZOLE 5 MG/1
5 TABLET ORAL DAILY
Qty: 90 TABLET | Refills: 0 | Status: SHIPPED | OUTPATIENT
Start: 2023-10-07

## 2023-10-13 ENCOUNTER — TELEPHONE (OUTPATIENT)
Age: 53
End: 2023-10-13

## 2023-10-13 NOTE — TELEPHONE ENCOUNTER
Rec'd call from patient requesting NEW for FULL BODY / HX OF BCC. Patient states prior derm - 6 month check. Explained wait/cancellation list processes and MyChart notification. Understanding was verbalized. Created wait/cancellation list for patient.  HIGH priority

## 2023-10-16 ENCOUNTER — RA CDI HCC (OUTPATIENT)
Dept: OTHER | Facility: HOSPITAL | Age: 53
End: 2023-10-16

## 2023-10-16 NOTE — PROGRESS NOTES
[address] [facility] coding opportunities       Chart reviewed, no opportunity found: CHART REVIEWED, NO OPPORTUNITY FOUND        Patients Insurance        Commercial Insurance: [employer]

## 2023-10-27 ENCOUNTER — OFFICE VISIT (OUTPATIENT)
Dept: FAMILY MEDICINE CLINIC | Facility: CLINIC | Age: 53
End: 2023-10-27
Payer: COMMERCIAL

## 2023-10-27 VITALS
HEART RATE: 66 BPM | HEIGHT: 70 IN | WEIGHT: 204.4 LBS | BODY MASS INDEX: 29.26 KG/M2 | TEMPERATURE: 98.1 F | RESPIRATION RATE: 16 BRPM | DIASTOLIC BLOOD PRESSURE: 70 MMHG | SYSTOLIC BLOOD PRESSURE: 100 MMHG

## 2023-10-27 DIAGNOSIS — F41.9 ANXIETY: ICD-10-CM

## 2023-10-27 DIAGNOSIS — Z00.00 ANNUAL PHYSICAL EXAM: Primary | ICD-10-CM

## 2023-10-27 DIAGNOSIS — F32.0 CURRENT MILD EPISODE OF MAJOR DEPRESSIVE DISORDER WITHOUT PRIOR EPISODE (HCC): Primary | ICD-10-CM

## 2023-10-27 DIAGNOSIS — Z23 ENCOUNTER FOR IMMUNIZATION: ICD-10-CM

## 2023-10-27 PROCEDURE — 90471 IMMUNIZATION ADMIN: CPT | Performed by: FAMILY MEDICINE

## 2023-10-27 PROCEDURE — 90686 IIV4 VACC NO PRSV 0.5 ML IM: CPT | Performed by: FAMILY MEDICINE

## 2023-10-27 PROCEDURE — 99214 OFFICE O/P EST MOD 30 MIN: CPT | Performed by: FAMILY MEDICINE

## 2023-10-27 RX ORDER — BUSPIRONE HYDROCHLORIDE 15 MG/1
15 TABLET ORAL 2 TIMES DAILY
Qty: 60 TABLET | Refills: 5
Start: 2023-10-27 | End: 2024-04-24

## 2023-10-27 NOTE — PROGRESS NOTES
Chief Complaint   Patient presents with   • Follow-up     6month follow up   Depression and Anxiety     Patient ID: Naomi Bush is a 48 y.o. male. HPI  Pt is seeing for f/u Depression -  stable -  and Anxiety  - still not controlled     The following portions of the patient's history were reviewed and updated as appropriate: allergies, current medications, past family history, past medical history, past social history, past surgical history and problem list.    Review of Systems   Constitutional: Negative. Respiratory: Negative. Cardiovascular: Negative. Gastrointestinal: Negative. Genitourinary: Negative. Musculoskeletal: Negative. Skin: Negative. Neurological: Negative. Psychiatric/Behavioral:  Negative for agitation, behavioral problems, decreased concentration, dysphoric mood, sleep disturbance and suicidal ideas. The patient is nervous/anxious. Current Outpatient Medications   Medication Sig Dispense Refill   •       • ALPRAZolam (XANAX) 0.25 mg tablet Take 1 tablet (0.25 mg total) by mouth daily as needed for anxiety 30 tablet 0   • ARIPiprazole (ABILIFY) 5 mg tablet Take 1 tablet (5 mg total) by mouth daily 90 tablet 0   • busPIRone (BUSPAR) 10 mg tablet Take 1 tablet (10 mg total) by mouth 2 (two) times a day 180 tablet 0   • DULoxetine (CYMBALTA) 30 mg delayed release capsule Take 1 capsule (30 mg total) by mouth daily 90 capsule 0     No current facility-administered medications for this visit. Objective:    /70 (BP Location: Left arm, Patient Position: Sitting, Cuff Size: Large)   Pulse 66   Temp 98.1 °F (36.7 °C)   Resp 16   Ht 5' 10" (1.778 m)   Wt 92.7 kg (204 lb 6.4 oz)   BMI 29.33 kg/m²        Physical Exam  Constitutional:       Appearance: He is not ill-appearing. Cardiovascular:      Rate and Rhythm: Normal rate. Pulmonary:      Effort: Pulmonary effort is normal. No respiratory distress.    Neurological:      General: No focal deficit present. Mental Status: He is alert and oriented to person, place, and time. Psychiatric:         Mood and Affect: Mood normal.         Thought Content: Thought content normal.         Judgment: Judgment normal.                 Assessment/Plan:         Diagnoses and all orders for this visit:    Current mild episode of major depressive disorder without prior episode ([address])    Stable  Cont meds  Encounter for immunization  -     influenza vaccine, quadrivalent, 0.5 mL, preservative-free, for adult and pediatric patients 6 mos+ (AFLURIA, FLUARIX, FLULAVAL, FLUZONE)    Anxiety  -   will increase med diose from 10mg BID to    busPIRone (BUSPAR) 15 mg tablet;  Take 1 tablet (15 mg total) by mouth 2 (two) times a day        Rto in 6 m   Annual labs were ordered                   Alvino Garrett MD

## 2023-11-04 LAB
ALBUMIN SERPL-MCNC: 4.4 G/DL (ref 3.8–4.9)
ALBUMIN/GLOB SERPL: 1.8 {RATIO} (ref 1.2–2.2)
ALP SERPL-CCNC: 74 IU/L (ref 44–121)
ALT SERPL-CCNC: 15 IU/L (ref 0–44)
AST SERPL-CCNC: 18 IU/L (ref 0–40)
BASOPHILS # BLD AUTO: 0 X10E3/UL (ref 0–0.2)
BASOPHILS NFR BLD AUTO: 0 %
BILIRUB SERPL-MCNC: 0.8 MG/DL (ref 0–1.2)
BUN SERPL-MCNC: 20 MG/DL (ref 6–24)
BUN/CREAT SERPL: 16 (ref 9–20)
CALCIUM SERPL-MCNC: 9.5 MG/DL (ref 8.7–10.2)
CHLORIDE SERPL-SCNC: 103 MMOL/L (ref 96–106)
CHOLEST SERPL-MCNC: 218 MG/DL (ref 100–199)
CO2 SERPL-SCNC: 28 MMOL/L (ref 20–29)
CREAT SERPL-MCNC: 1.24 MG/DL (ref 0.76–1.27)
EGFR: 70 ML/MIN/1.73
EOSINOPHIL # BLD AUTO: 0.2 X10E3/UL (ref 0–0.4)
EOSINOPHIL NFR BLD AUTO: 3 %
ERYTHROCYTE [DISTWIDTH] IN BLOOD BY AUTOMATED COUNT: 12.2 % (ref 11.6–15.4)
GLOBULIN SER-MCNC: 2.4 G/DL (ref 1.5–4.5)
GLUCOSE SERPL-MCNC: 102 MG/DL (ref 70–99)
HBA1C MFR BLD: 5.2 % (ref 4.8–5.6)
HCT VFR BLD AUTO: 48 % (ref 37.5–51)
HDLC SERPL-MCNC: 52 MG/DL
HGB BLD-MCNC: 16.3 G/DL (ref 13–17.7)
IMM GRANULOCYTES # BLD: 0 X10E3/UL (ref 0–0.1)
IMM GRANULOCYTES NFR BLD: 0 %
LDLC SERPL CALC-MCNC: 148 MG/DL (ref 0–99)
LYMPHOCYTES # BLD AUTO: 1.8 X10E3/UL (ref 0.7–3.1)
LYMPHOCYTES NFR BLD AUTO: 30 %
MCH RBC QN AUTO: 31.4 PG (ref 26.6–33)
MCHC RBC AUTO-ENTMCNC: 34 G/DL (ref 31.5–35.7)
MCV RBC AUTO: 93 FL (ref 79–97)
MICRODELETION SYND BLD/T FISH: NORMAL
MONOCYTES # BLD AUTO: 0.4 X10E3/UL (ref 0.1–0.9)
MONOCYTES NFR BLD AUTO: 7 %
NEUTROPHILS # BLD AUTO: 3.5 X10E3/UL (ref 1.4–7)
NEUTROPHILS NFR BLD AUTO: 60 %
PLATELET # BLD AUTO: 190 X10E3/UL (ref 150–450)
POTASSIUM SERPL-SCNC: 4.9 MMOL/L (ref 3.5–5.2)
PROT SERPL-MCNC: 6.8 G/DL (ref 6–8.5)
RBC # BLD AUTO: 5.19 X10E6/UL (ref 4.14–5.8)
SL AMB VLDL CHOLESTEROL CALC: 18 MG/DL (ref 5–40)
SODIUM SERPL-SCNC: 143 MMOL/L (ref 134–144)
TRIGL SERPL-MCNC: 102 MG/DL (ref 0–149)
TSH SERPL DL<=0.005 MIU/L-ACNC: 1.02 UIU/ML (ref 0.45–4.5)
WBC # BLD AUTO: 5.8 X10E3/UL (ref 3.4–10.8)

## 2023-11-21 DIAGNOSIS — F32.0 CURRENT MILD EPISODE OF MAJOR DEPRESSIVE DISORDER WITHOUT PRIOR EPISODE (HCC): ICD-10-CM

## 2023-11-22 RX ORDER — DULOXETIN HYDROCHLORIDE 30 MG/1
30 CAPSULE, DELAYED RELEASE ORAL DAILY
Qty: 90 CAPSULE | Refills: 1 | Status: SHIPPED | OUTPATIENT
Start: 2023-11-22

## 2023-11-27 ENCOUNTER — CONSULT (OUTPATIENT)
Dept: UROLOGY | Facility: CLINIC | Age: 53
End: 2023-11-27
Payer: COMMERCIAL

## 2023-11-27 VITALS
HEIGHT: 70 IN | DIASTOLIC BLOOD PRESSURE: 82 MMHG | SYSTOLIC BLOOD PRESSURE: 124 MMHG | OXYGEN SATURATION: 99 % | BODY MASS INDEX: 29.49 KG/M2 | WEIGHT: 206 LBS | HEART RATE: 67 BPM

## 2023-11-27 DIAGNOSIS — Z30.09 VASECTOMY EVALUATION: ICD-10-CM

## 2023-11-27 PROCEDURE — 99204 OFFICE O/P NEW MOD 45 MIN: CPT | Performed by: STUDENT IN AN ORGANIZED HEALTH CARE EDUCATION/TRAINING PROGRAM

## 2023-12-01 ENCOUNTER — TELEPHONE (OUTPATIENT)
Dept: UROLOGY | Facility: MEDICAL CENTER | Age: 53
End: 2023-12-01

## 2023-12-01 NOTE — TELEPHONE ENCOUNTER
I spoke to the patient and scheduled his procedure for 1/3/2024 at [facility] with Dr. James Acevedo    -instructions given verbally and mailed  -patient aware to be NPO, needs a    -CBC, CMP, Urine C&S  2 weeks prior

## 2023-12-11 DIAGNOSIS — F41.9 ANXIETY: ICD-10-CM

## 2023-12-12 ENCOUNTER — TELEPHONE (OUTPATIENT)
Age: 53
End: 2023-12-12

## 2023-12-12 RX ORDER — ALPRAZOLAM 0.25 MG/1
0.25 TABLET ORAL DAILY PRN
Qty: 30 TABLET | Refills: 1 | Status: SHIPPED | OUTPATIENT
Start: 2023-12-12

## 2023-12-18 DIAGNOSIS — F41.9 ANXIETY: ICD-10-CM

## 2023-12-19 RX ORDER — BUSPIRONE HYDROCHLORIDE 15 MG/1
15 TABLET ORAL 2 TIMES DAILY
Qty: 180 TABLET | Refills: 1 | Status: SHIPPED | OUTPATIENT
Start: 2023-12-19 | End: 2024-06-16

## 2023-12-29 DIAGNOSIS — F32.0 CURRENT MILD EPISODE OF MAJOR DEPRESSIVE DISORDER WITHOUT PRIOR EPISODE (HCC): ICD-10-CM

## 2023-12-29 RX ORDER — ARIPIPRAZOLE 5 MG/1
5 TABLET ORAL DAILY
Qty: 90 TABLET | Refills: 0 | Status: SHIPPED | OUTPATIENT
Start: 2023-12-29

## 2023-12-29 NOTE — TELEPHONE ENCOUNTER
Refill must be reviewed and completed by the office or provider. The refill is unable to be approved by the medication management team.

## 2024-01-11 RX ORDER — IBUPROFEN 200 MG
TABLET ORAL EVERY 6 HOURS PRN
COMMUNITY

## 2024-01-11 NOTE — PRE-PROCEDURE INSTRUCTIONS
Pre-Surgery Instructions:   Medication Instructions    ALPRAZolam (XANAX) 0.25 mg tablet Uses PRN- DO NOT take day of surgery    ARIPiprazole (ABILIFY) 5 mg tablet Take night before surgery    busPIRone (BUSPAR) 15 mg tablet Take day of surgery.    DULoxetine (CYMBALTA) 30 mg delayed release capsule Take day of surgery.    ibuprofen (MOTRIN) 200 mg tablet Stop taking 5 days prior to surgery.    Medication instructions for day surgery reviewed. Please use only a sip of water to take your instructed medications. Avoid all over the counter vitamins, supplements and NSAIDS for one week prior to surgery per anesthesia guidelines. Tylenol is ok to take as needed.     You will receive a call one business day prior to surgery with an arrival time and hospital directions. If your surgery is scheduled on a Monday, the hospital will be calling you on the Friday prior to your surgery. If you have not heard from anyone by 8pm, please call the hospital supervisor through the hospital  at [phone]. (Ben [phone]).    Do not eat or drink anything after midnight the night before your surgery, including candy, mints, lifesavers, or chewing gum. Do not drink alcohol 24hrs before your surgery. Try not to smoke at least 24hrs before your surgery.       Follow the pre surgery showering instructions as listed in the “My Surgical Experience Booklet” or otherwise provided by your surgeon's office. Do not use a blade to shave the surgical area 1 week before surgery. It is okay to use a clean electric clippers up to 24 hours before surgery. Do not apply any lotions, creams, including makeup, cologne, deodorant, or perfumes after showering on the day of your surgery. Do not use dry shampoo, hair spray, hair gel, or any type of hair products.     No contact lenses, eye make-up, or artificial eyelashes. Remove nail polish, including gel polish, and any artificial, gel, or acrylic nails if possible. Remove all jewelry  including rings and body piercing jewelry.     Wear causal clothing that is easy to take on and off. Consider your type of surgery.    Keep any valuables, jewelry, piercings at home. Please bring any specially ordered equipment (sling, braces) if indicated.    Arrange for a responsible person to drive you to and from the hospital on the day of your surgery. Visitor Guidelines discussed.     Call the surgeon's office with any new illnesses, exposures, or additional questions prior to surgery.    Please reference your “My Surgical Experience Booklet” for additional information to prepare for your upcoming surgery.

## 2024-01-13 ENCOUNTER — APPOINTMENT (OUTPATIENT)
Dept: LAB | Facility: HOSPITAL | Age: 54
End: 2024-01-13
Payer: COMMERCIAL

## 2024-01-13 DIAGNOSIS — Z30.09 VASECTOMY EVALUATION: ICD-10-CM

## 2024-01-13 LAB
ALBUMIN SERPL BCP-MCNC: 4 G/DL (ref 3.5–5)
ALP SERPL-CCNC: 54 U/L (ref 34–104)
ALT SERPL W P-5'-P-CCNC: 14 U/L (ref 7–52)
ANION GAP SERPL CALCULATED.3IONS-SCNC: 4 MMOL/L
AST SERPL W P-5'-P-CCNC: 15 U/L (ref 13–39)
BASOPHILS # BLD AUTO: 0.02 THOUSANDS/ÂΜL (ref 0–0.1)
BASOPHILS NFR BLD AUTO: 0 % (ref 0–1)
BILIRUB SERPL-MCNC: 0.79 MG/DL (ref 0.2–1)
BUN SERPL-MCNC: 21 MG/DL (ref 5–25)
CALCIUM SERPL-MCNC: 9 MG/DL (ref 8.4–10.2)
CHLORIDE SERPL-SCNC: 105 MMOL/L (ref 96–108)
CO2 SERPL-SCNC: 31 MMOL/L (ref 21–32)
CREAT SERPL-MCNC: 1.03 MG/DL (ref 0.6–1.3)
EOSINOPHIL # BLD AUTO: 0.11 THOUSAND/ÂΜL (ref 0–0.61)
EOSINOPHIL NFR BLD AUTO: 2 % (ref 0–6)
ERYTHROCYTE [DISTWIDTH] IN BLOOD BY AUTOMATED COUNT: 12.3 % (ref 11.6–15.1)
GFR SERPL CREATININE-BSD FRML MDRD: 82 ML/MIN/1.73SQ M
GLUCOSE P FAST SERPL-MCNC: 107 MG/DL (ref 65–99)
HCT VFR BLD AUTO: 44.6 % (ref 36.5–49.3)
HGB BLD-MCNC: 15.6 G/DL (ref 12–17)
IMM GRANULOCYTES # BLD AUTO: 0.01 THOUSAND/UL (ref 0–0.2)
IMM GRANULOCYTES NFR BLD AUTO: 0 % (ref 0–2)
LYMPHOCYTES # BLD AUTO: 1.37 THOUSANDS/ÂΜL (ref 0.6–4.47)
LYMPHOCYTES NFR BLD AUTO: 28 % (ref 14–44)
MCH RBC QN AUTO: 31.8 PG (ref 26.8–34.3)
MCHC RBC AUTO-ENTMCNC: 35 G/DL (ref 31.4–37.4)
MCV RBC AUTO: 91 FL (ref 82–98)
MONOCYTES # BLD AUTO: 0.34 THOUSAND/ÂΜL (ref 0.17–1.22)
MONOCYTES NFR BLD AUTO: 7 % (ref 4–12)
NEUTROPHILS # BLD AUTO: 3.03 THOUSANDS/ÂΜL (ref 1.85–7.62)
NEUTS SEG NFR BLD AUTO: 63 % (ref 43–75)
NRBC BLD AUTO-RTO: 0 /100 WBCS
PLATELET # BLD AUTO: 163 THOUSANDS/UL (ref 149–390)
PMV BLD AUTO: 9.2 FL (ref 8.9–12.7)
POTASSIUM SERPL-SCNC: 4.3 MMOL/L (ref 3.5–5.3)
PROT SERPL-MCNC: 6.5 G/DL (ref 6.4–8.4)
RBC # BLD AUTO: 4.9 MILLION/UL (ref 3.88–5.62)
SODIUM SERPL-SCNC: 140 MMOL/L (ref 135–147)
WBC # BLD AUTO: 4.88 THOUSAND/UL (ref 4.31–10.16)

## 2024-01-13 PROCEDURE — 85025 COMPLETE CBC W/AUTO DIFF WBC: CPT

## 2024-01-13 PROCEDURE — 80053 COMPREHEN METABOLIC PANEL: CPT

## 2024-01-13 PROCEDURE — 87086 URINE CULTURE/COLONY COUNT: CPT

## 2024-01-13 PROCEDURE — 36415 COLL VENOUS BLD VENIPUNCTURE: CPT

## 2024-01-14 LAB — BACTERIA UR CULT: NORMAL

## 2024-01-16 ENCOUNTER — ANESTHESIA EVENT (OUTPATIENT)
Dept: PERIOP | Facility: HOSPITAL | Age: 54
End: 2024-01-16
Payer: COMMERCIAL

## 2024-01-16 PROBLEM — R42 VERTIGO: Status: ACTIVE | Noted: 2024-01-16

## 2024-01-16 NOTE — ANESTHESIA PREPROCEDURE EVALUATION
Procedure:  VASECTOMY (Bilateral: Scrotum)    Relevant Problems   NEURO/PSYCH   (+) Anxiety   (+) Current mild episode of major depressive disorder without prior episode (HCC)      Other   (+) Vertigo        Physical Exam    Airway    Mallampati score: II  TM Distance: >3 FB  Neck ROM: full     Dental   Comment: Denies loose teeth     Cardiovascular  Cardiovascular exam normal    Pulmonary  Pulmonary exam normal     Other Findings  Portions of exam deferred due to low yield and/or unknown COVID status      Anesthesia Plan  ASA Score- 2     Anesthesia Type- IV sedation with anesthesia with ASA Monitors.         Additional Monitors:     Airway Plan:            Plan Factors-    Chart reviewed.        Patient is not a current smoker.              Induction- intravenous.    Postoperative Plan- Plan for postoperative opioid use.     Informed Consent-   I personally reviewed this patient with the CRNA. Discussed and agreed on the Anesthesia Plan with the CRNA..

## 2024-01-17 ENCOUNTER — HOSPITAL ENCOUNTER (OUTPATIENT)
Facility: HOSPITAL | Age: 54
Setting detail: OUTPATIENT SURGERY
Discharge: HOME | End: 2024-01-17
Attending: STUDENT IN AN ORGANIZED HEALTH CARE EDUCATION/TRAINING PROGRAM | Admitting: STUDENT IN AN ORGANIZED HEALTH CARE EDUCATION/TRAINING PROGRAM
Payer: COMMERCIAL

## 2024-01-17 ENCOUNTER — ANESTHESIA (OUTPATIENT)
Dept: PERIOP | Facility: HOSPITAL | Age: 54
End: 2024-01-17
Payer: COMMERCIAL

## 2024-01-17 VITALS
BODY MASS INDEX: 28.37 KG/M2 | SYSTOLIC BLOOD PRESSURE: 114 MMHG | WEIGHT: 198.19 LBS | DIASTOLIC BLOOD PRESSURE: 75 MMHG | TEMPERATURE: 97.4 F | HEART RATE: 79 BPM | RESPIRATION RATE: 16 BRPM | OXYGEN SATURATION: 97 % | HEIGHT: 70 IN

## 2024-01-17 DIAGNOSIS — Z30.09 VASECTOMY EVALUATION: ICD-10-CM

## 2024-01-17 PROCEDURE — 88302 TISSUE EXAM BY PATHOLOGIST: CPT | Performed by: STUDENT IN AN ORGANIZED HEALTH CARE EDUCATION/TRAINING PROGRAM

## 2024-01-17 PROCEDURE — NC001 PR NO CHARGE: Performed by: STUDENT IN AN ORGANIZED HEALTH CARE EDUCATION/TRAINING PROGRAM

## 2024-01-17 PROCEDURE — 55250 REMOVAL OF SPERM DUCT(S): CPT | Performed by: STUDENT IN AN ORGANIZED HEALTH CARE EDUCATION/TRAINING PROGRAM

## 2024-01-17 RX ORDER — MIDAZOLAM HYDROCHLORIDE 2 MG/2ML
INJECTION, SOLUTION INTRAMUSCULAR; INTRAVENOUS AS NEEDED
Status: DISCONTINUED | OUTPATIENT
Start: 2024-01-17 | End: 2024-01-17

## 2024-01-17 RX ORDER — FENTANYL CITRATE 50 UG/ML
INJECTION, SOLUTION INTRAMUSCULAR; INTRAVENOUS AS NEEDED
Status: DISCONTINUED | OUTPATIENT
Start: 2024-01-17 | End: 2024-01-17

## 2024-01-17 RX ORDER — LIDOCAINE HYDROCHLORIDE 20 MG/ML
INJECTION, SOLUTION EPIDURAL; INFILTRATION; INTRACAUDAL; PERINEURAL AS NEEDED
Status: DISCONTINUED | OUTPATIENT
Start: 2024-01-17 | End: 2024-01-17 | Stop reason: HOSPADM

## 2024-01-17 RX ORDER — GINSENG 100 MG
CAPSULE ORAL AS NEEDED
Status: DISCONTINUED | OUTPATIENT
Start: 2024-01-17 | End: 2024-01-17 | Stop reason: HOSPADM

## 2024-01-17 RX ORDER — MAGNESIUM HYDROXIDE 1200 MG/15ML
LIQUID ORAL AS NEEDED
Status: DISCONTINUED | OUTPATIENT
Start: 2024-01-17 | End: 2024-01-17 | Stop reason: HOSPADM

## 2024-01-17 RX ORDER — FENTANYL CITRATE/PF 50 MCG/ML
25 SYRINGE (ML) INJECTION
Status: DISCONTINUED | OUTPATIENT
Start: 2024-01-17 | End: 2024-01-17 | Stop reason: HOSPADM

## 2024-01-17 RX ORDER — PROPOFOL 10 MG/ML
INJECTION, EMULSION INTRAVENOUS CONTINUOUS PRN
Status: DISCONTINUED | OUTPATIENT
Start: 2024-01-17 | End: 2024-01-17

## 2024-01-17 RX ORDER — KETOROLAC TROMETHAMINE 30 MG/ML
INJECTION, SOLUTION INTRAMUSCULAR; INTRAVENOUS AS NEEDED
Status: DISCONTINUED | OUTPATIENT
Start: 2024-01-17 | End: 2024-01-17

## 2024-01-17 RX ORDER — LIDOCAINE HYDROCHLORIDE 20 MG/ML
INJECTION, SOLUTION EPIDURAL; INFILTRATION; INTRACAUDAL; PERINEURAL AS NEEDED
Status: DISCONTINUED | OUTPATIENT
Start: 2024-01-17 | End: 2024-01-17

## 2024-01-17 RX ORDER — SODIUM CHLORIDE, SODIUM LACTATE, POTASSIUM CHLORIDE, CALCIUM CHLORIDE 600; 310; 30; 20 MG/100ML; MG/100ML; MG/100ML; MG/100ML
75 INJECTION, SOLUTION INTRAVENOUS CONTINUOUS
Status: DISCONTINUED | OUTPATIENT
Start: 2024-01-17 | End: 2024-01-17 | Stop reason: HOSPADM

## 2024-01-17 RX ORDER — CEFAZOLIN SODIUM 2 G/50ML
2000 SOLUTION INTRAVENOUS
Status: COMPLETED | OUTPATIENT
Start: 2024-01-17 | End: 2024-01-17

## 2024-01-17 RX ORDER — PROPOFOL 10 MG/ML
INJECTION, EMULSION INTRAVENOUS AS NEEDED
Status: DISCONTINUED | OUTPATIENT
Start: 2024-01-17 | End: 2024-01-17

## 2024-01-17 RX ADMIN — FENTANYL CITRATE 25 MCG: 50 INJECTION, SOLUTION INTRAMUSCULAR; INTRAVENOUS at 09:19

## 2024-01-17 RX ADMIN — PROPOFOL 100 MG: 10 INJECTION, EMULSION INTRAVENOUS at 08:39

## 2024-01-17 RX ADMIN — KETOROLAC TROMETHAMINE 30 MG: 30 INJECTION, SOLUTION INTRAMUSCULAR; INTRAVENOUS at 09:20

## 2024-01-17 RX ADMIN — LIDOCAINE HYDROCHLORIDE 50 MG: 20 INJECTION, SOLUTION EPIDURAL; INFILTRATION; INTRACAUDAL; PERINEURAL at 08:39

## 2024-01-17 RX ADMIN — CEFAZOLIN SODIUM 2000 MG: 2 SOLUTION INTRAVENOUS at 08:37

## 2024-01-17 RX ADMIN — MIDAZOLAM 2 MG: 1 INJECTION INTRAMUSCULAR; INTRAVENOUS at 08:35

## 2024-01-17 RX ADMIN — FENTANYL CITRATE 50 MCG: 50 INJECTION, SOLUTION INTRAMUSCULAR; INTRAVENOUS at 08:39

## 2024-01-17 RX ADMIN — SODIUM CHLORIDE, SODIUM LACTATE, POTASSIUM CHLORIDE, AND CALCIUM CHLORIDE 75 ML/HR: .6; .31; .03; .02 INJECTION, SOLUTION INTRAVENOUS at 07:25

## 2024-01-17 RX ADMIN — PROPOFOL 100 MCG/KG/MIN: 10 INJECTION, EMULSION INTRAVENOUS at 08:39

## 2024-01-17 RX ADMIN — FENTANYL CITRATE 25 MCG: 50 INJECTION, SOLUTION INTRAMUSCULAR; INTRAVENOUS at 08:52

## 2024-01-17 NOTE — DISCHARGE INSTR - AVS FIRST PAGE
Mr Garcia,    You underwent bilateral vasectomy today.  You may use ice on and off the scrotum for 20 minutes at a time. Please make an appointment with with lab for semen analysis in 3 months to evaluate for sterility.  Please to continue to use a backup method until our office tells you that you are sterile. The most common reason for unwanted pregnancy after vasectomy is failure to use a backup method. If you have any issues in the meantime please call our office at [phone].    Best,    Dr Miranda      Vasectomy   WHAT YOU NEED TO KNOW:   A vasectomy is a procedure to make you sterile. It is a permanent form of birth control. The vas deferens (sperm tubes) are cut so that the semen does not contain sperm.        DISCHARGE INSTRUCTIONS:   Seek care immediately if:   Your incision wound comes apart.    You see blood in your urine or have excess bleeding from the incision.  If minor bleeding occurs hold pressure uninterrupted for 20 minutes and reassess.  If you continue to have bleeding please call our office.    Contact your healthcare provider or surgeon if:   You have a fever.    Your wound is red, swollen, or draining pus.    You feel pain or burning when you urinate.    You have worsening pain in your scrotum, even after you take medicine.    You have questions or concerns about your condition or care.    Medicines:   NSAIDs  help decrease swelling and pain or fever. This medicine is available with or without a prescription. NSAIDs can cause stomach bleeding or kidney problems in certain people. If you take blood thinner medicine, always ask your primary care provider if NSAIDs are safe for you. Always read the medicine label and follow directions.    You may take Tylenol(acetaminophen) and Motrin(ibuprofen) in alternating fashion for control of pain.      Take your medicine as directed.  Contact your healthcare provider if you think your medicine is not helping or if you have side effects. Tell your  provider if you are allergic to any medicine. Keep a list of the medicines, vitamins, and herbs you take. Include the amounts, and when and why you take them. Bring the list or the pill bottles to follow-up visits. Carry your medicine list with you in case of an emergency.    Self-care:   Care for your wound as directed.  Do not shower for 24 hours. When you do shower, carefully wash the wound with soap and water. Pat the area dry gently. Do not swim or take a bath for at least 1 week.     Limit activity  for at least 2 days. Do not play sports, do yard work, or lift anything heavy for about 2 weeks. Talk to your healthcare provider about when you can return to work.    Apply ice  on your scrotum for 15 to 20 minutes every hour for 2 days. Use an ice pack, or put crushed ice in a plastic bag. Cover it with a towel. Ice helps prevent tissue damage and decreases swelling and pain.    Wear an athletic supporter for at least 2 days.  This will decrease pain and swelling, and protect your wound. Place a cushion such as a washcloth or small towel under your scrotum to elevate it.    Do not have sex for at least 1 week.  You will not be sterile right away after a vasectomy. It will take time for all the sperm to be cleared from your body. You may need to ejaculate about 20 times or wait up to 3 months for the sperm to clear. Use another form of birth control during this time.

## 2024-01-17 NOTE — H&P
UROLOGY HISTORY AND PHYSICAL  Name: Yonatan Garcia  : 1970  MRN: [id number]  Date of Service: 24    Surgical indication:  Elective sterilization    History of present illness:  Yonatan Garcia is a 53 y.o. male presenting for elective sterilization by vasectomy.  Surgical history significant for hydrocele repair about 23 years ago.  Patient has no additional questions and would like to proceed.  We discussed return to work on Monday.    Past medical history:  Past Medical History:   Diagnosis Date    Anxiety     Back pain     had PT    Depression     Headache     Neck pain     hx of cervical fusion    Vertigo 2024       Past surgical history  Past Surgical History:   Procedure Laterality Date    APPENDECTOMY      CERVICAL FUSION N/A     C 6,7 fusion    HYDROCELE EXCISION / REPAIR      AK COLONOSCOPY FLX DX W/COLLJ SPEC WHEN PFRMD N/A 2018    Procedure: COLONOSCOPY;  Surgeon: Mani Wade MD;  Location: [facility] GI LAB;  Service: Gastroenterology    ROTATOR CUFF REPAIR Right     SHOULDER SURGERY Left     arthroscopy       Current medications:  Current Facility-Administered Medications   Medication Dose Route Frequency Provider Last Rate Last Admin    ceFAZolin (ANCEF) IVPB (premix in dextrose) 2,000 mg 50 mL  2,000 mg Intravenous On Call To OR Anat Miranda MD        lactated ringers infusion  75 mL/hr Intravenous Continuous Alec Minesh Drake MD 75 mL/hr at 24 0755 Continue from Pre-op at 24 0755       Allergies:  No Known Allergies    Social history:  Social History     Socioeconomic History    Marital status: /Civil Union     Spouse name: None    Number of children: None    Years of education: None    Highest education level: None   Occupational History    None   Tobacco Use    Smoking status: Former    Smokeless tobacco: Never    Tobacco comments:     Quit 2008   Vaping Use    Vaping status: Never Used   Substance and Sexual Activity    Alcohol use: Yes  "    Comment: socially    Drug use: No    Sexual activity: Yes   Other Topics Concern    None   Social History Narrative    None     Social Determinants of Health     Financial Resource Strain: Not on file   Food Insecurity: Not on file   Transportation Needs: Not on file   Physical Activity: Not on file   Stress: Not on file   Social Connections: Not on file   Intimate Partner Violence: Not on file   Housing Stability: Not on file       Family history:  Family History   Problem Relation Age of Onset    Breast cancer Mother     Diabetes Father     Hypertension Father     No Known Problems Sister     No Known Problems Brother     No Known Problems Daughter     No Known Problems Daughter        Review of systems:  Pertinent positives and negatives in HPI    Physical exam:  Pulse 79   Temp 98.1 °F (36.7 °C) (Skin)   Resp 18   Ht 5' 10\" (1.778 m)   Wt 89.9 kg (198 lb 3.1 oz)   SpO2 97%   BMI 28.44 kg/m²   General:  Healthy appearing male in no acute distress.   Head:  Normocephalic, atraumatic.   Eyes: no scleral icterus  ENMT: Nares patent, moist mucous membranes  Cardiovascular:  Regular rate  Respiratory:  Patient has unlabored respirations.   Abdomen:  Abdomen nondistended  Extremities: Normal ROM, no deformities  Genitourinary: Deferred    Labs:  CBC:   Lab Results   Component Value Date    WBC 4.88 01/13/2024    HGB 15.6 01/13/2024    HCT 44.6 01/13/2024    MCV 91 01/13/2024     01/13/2024       BMP:   Lab Results   Component Value Date    CALCIUM 9.0 01/13/2024    K 4.3 01/13/2024    CO2 31 01/13/2024     01/13/2024    BUN 21 01/13/2024    CREATININE 1.03 01/13/2024     Assessment:  Vasectomy for elective sterilization  History of hydrocele repair    Plan:  OR for bilateral vasectomy  Informed consent previously obtained  Ancef on-call    Anat Miranda MD  [facility]    Portions of the above record have been created with voice recognition software. Occasional wrong word or " "\"sound alike\" substitution may have occurred due to the inherent limitations of voice recognition software.  Please read the chart carefully and recognize, using context, where substitution may have occurred.  For further clarification, please contact me directly.        "

## 2024-01-17 NOTE — ANESTHESIA POSTPROCEDURE EVALUATION
Post-Op Assessment Note    CV Status:  Stable  Pain Score: 0    Pain management: adequate       Mental Status:  Sleepy and arousable   Hydration Status:  Stable   PONV Controlled:  None   Airway Patency:  Patent and adequate     Post Op Vitals Reviewed: Yes      Staff: CRNA             /63 (01/17/24 0933)    Temp (!) 97.4 °F (36.3 °C) (01/17/24 0933)    Pulse 72 (01/17/24 0933)   Resp      SpO2

## 2024-01-17 NOTE — PERIOPERATIVE NURSING NOTE
OOB to bathroom, voided large amount clear yellow urine.  Criteria met for discharge.  IV removed.  Instructions discussed with patient and wife, all questions answered.

## 2024-01-17 NOTE — OP NOTE
OPERATIVE REPORT  PATIENT NAME: Yonatan Garcia    :  1970  MRN: [id number]  Pt Location: [facility] OR ROOM 04    SURGERY DATE: 2024    Surgeons and Role:     * Anat Mrianda MD - Primary    Preop Diagnosis:  Vasectomy evaluation [Z30.09]    Post-Op Diagnosis Codes:     * Vasectomy evaluation [Z30.09]    Procedure(s):  Bilateral - VASECTOMY    Specimen(s):  ID Type Source Tests Collected by Time Destination   1 :  Tissue Vas Deferens, Left TISSUE EXAM Anat Miranda MD 2024 0843    2 :  Tissue Vas Deferens, Right TISSUE EXAM Anat Miranda MD 2024 0843        Estimated Blood Loss:   Minimal    Drains:  * No LDAs found *    Anesthesia Type:   General/LMA    Operative Indications:  Vasectomy evaluation [Z30.09]    Operative Findings:  Expected mild scar tissue tissue along the right vas deferens and spermatic cord. The vas was ultimately able to successfully be isolated and transected in typical fashion. Routine left vasectomy.    Complications:   None    Procedure and Technique:  The patient's identity and the procedure were verified.  The patient was positioned in the supine position.  The genitalia were shaved. His external genitalia were prepped and draped in the usual sterile manner.  An operative time out was performed to confirm the patient identity and procedure. The left vas was then isolated between thumb and index finger.  The scrotal skin overlying the vas, as well as perivasal tissues, were then infiltrated with 2% plain Lidocaine solution.  A small puncture wound was then made in the skin overlying the vas with a sharp tip hemostat.  The vas was isolated and grasped with a vas ring clamp. The sheath was opened with a 15 blade scalpel and a second vas clamp was used to grasp the vas and separate it up and away from its sheath. The vas deferens was freed from the vasal sheath with the pointed hemostat. A segment of the vas was excised and sent for pathology.   The abdominal and  testicular ends of the vas were then occluded using the Bovie by intubating the needlepoint cautery into the lumen of the cut ends of the vas on each side.  Hemostasis was then secured with electrocautery only as needed. A small titanium clip was placed in the abdominal end of the vas and fascial interposition was achieved using 4-0 chromic suture. The vas was then replaced intrascrotally.  A similar procedure was performed on the contralateral side.  Isolation of the vas was facilitated by grasping the vas with a vas clamp at the skin. Hemostasis was re-assessed and the skin was re-approximated with a single buried 4-0 suture bilaterally. The sites were cleaned and bacitracin was applied. Pressure was held for an additional 5 minutes. The patient tolerated the procedure well and was transported to the recovery room in stable condition.      I was present for the entire procedure.    Patient Disposition:  PACU     Plan:  Discharge to home  Scrotal support, ice packs prn, no strenuous activity for 72h, no ejaculation X 1 week  Semen Analysis in 3 months.       SIGNATURE: Anat Miranda MD  DATE: January 17, 2024  TIME: 9:34 AM

## 2024-01-17 NOTE — PERIOPERATIVE NURSING NOTE
Received patient to APU from PACU, alert, VSS.  Scrotal dressing dry and intact, sling in place.  Pt reporting no pain at this time.  Tolerating po fluids.  No urge to void.  Wife with patient in room.

## 2024-01-17 NOTE — LETTER
[facility] OPERATING ROOM  [address] [place] [id number]  Dept: [phone]    January 17, 2024     Patient: Yonatan Garcia   YOB: 1970   Date of Visit: 1/17/2024       To Whom it May Concern:    Yonatan Garcia is under my professional care. He was seen in the hospital from 1/17/2024 to 01/17/24. He may return to work on Monday 1/22/24 without limitations.    If you have any questions or concerns, please don't hesitate to call.         Sincerely,          Anat Miranda MD

## 2024-01-22 PROCEDURE — 88302 TISSUE EXAM BY PATHOLOGIST: CPT | Performed by: STUDENT IN AN ORGANIZED HEALTH CARE EDUCATION/TRAINING PROGRAM

## 2024-04-05 DIAGNOSIS — F32.0 CURRENT MILD EPISODE OF MAJOR DEPRESSIVE DISORDER WITHOUT PRIOR EPISODE (HCC): ICD-10-CM

## 2024-04-05 RX ORDER — ARIPIPRAZOLE 5 MG/1
5 TABLET ORAL
Qty: 90 TABLET | Refills: 1 | Status: SHIPPED | OUTPATIENT
Start: 2024-04-05

## 2024-04-26 ENCOUNTER — OFFICE VISIT (OUTPATIENT)
Dept: FAMILY MEDICINE CLINIC | Facility: CLINIC | Age: 54
End: 2024-04-26
Payer: COMMERCIAL

## 2024-04-26 VITALS
RESPIRATION RATE: 18 BRPM | TEMPERATURE: 98.3 F | HEART RATE: 84 BPM | BODY MASS INDEX: 28.58 KG/M2 | WEIGHT: 199.6 LBS | HEIGHT: 70 IN | OXYGEN SATURATION: 96 % | DIASTOLIC BLOOD PRESSURE: 70 MMHG | SYSTOLIC BLOOD PRESSURE: 104 MMHG

## 2024-04-26 DIAGNOSIS — F32.0 CURRENT MILD EPISODE OF MAJOR DEPRESSIVE DISORDER WITHOUT PRIOR EPISODE (HCC): Primary | ICD-10-CM

## 2024-04-26 DIAGNOSIS — F41.9 ANXIETY: ICD-10-CM

## 2024-04-26 PROBLEM — R06.2 WHEEZE: Status: RESOLVED | Noted: 2023-06-21 | Resolved: 2024-04-26

## 2024-04-26 PROBLEM — R42 VERTIGO: Status: RESOLVED | Noted: 2024-01-16 | Resolved: 2024-04-26

## 2024-04-26 PROCEDURE — 99214 OFFICE O/P EST MOD 30 MIN: CPT | Performed by: FAMILY MEDICINE

## 2024-04-26 RX ORDER — ALPRAZOLAM 0.25 MG/1
0.25 TABLET ORAL
Qty: 30 TABLET | Refills: 3 | Status: SHIPPED | OUTPATIENT
Start: 2024-04-26

## 2024-04-26 NOTE — PROGRESS NOTES
"Chief Complaint   Patient presents with   • Follow-up   • Depression   • Anxiety        Patient ID: Yonatan Garcia is a 53 y.o. male.    HPI  Pt is seeing for f/u Depression and Anxiety -  stable     The following portions of the patient's history were reviewed and updated as appropriate: allergies, current medications, past family history, past medical history, past social history, past surgical history and problem list.    Review of Systems   Constitutional: Negative.    Respiratory: Negative.     Cardiovascular: Negative.    Gastrointestinal: Negative.    Genitourinary: Negative.    Musculoskeletal: Negative.    Skin: Negative.    Neurological: Negative.    Psychiatric/Behavioral:  Negative for behavioral problems, decreased concentration, dysphoric mood, self-injury, sleep disturbance and suicidal ideas. The patient is nervous/anxious.        Current Outpatient Medications   Medication Sig Dispense Refill   • ALPRAZolam (XANAX) 0.25 mg tablet Take 1 tablet (0.25 mg total) by mouth daily as needed for anxiety (Patient taking differently: Take 0.25 mg by mouth daily at bedtime as needed for anxiety) 30 tablet 1   • ARIPiprazole (ABILIFY) 5 mg tablet Take 1 tablet (5 mg total) by mouth daily at bedtime 90 tablet 1   • busPIRone (BUSPAR) 15 mg tablet Take 1 tablet (15 mg total) by mouth 2 (two) times a day 180 tablet 1   • DULoxetine (CYMBALTA) 30 mg delayed release capsule Take 1 capsule (30 mg total) by mouth daily (Patient taking differently: Take 30 mg by mouth every morning) 90 capsule 1   • ibuprofen (MOTRIN) 200 mg tablet Take by mouth every 6 (six) hours as needed for mild pain (Patient not taking: Reported on 4/26/2024)       No current facility-administered medications for this visit.       Objective:    /70 (BP Location: Left arm, Patient Position: Sitting, Cuff Size: Large)   Pulse 84   Temp 98.3 °F (36.8 °C) (Temporal)   Resp 18   Ht 5' 10\" (1.778 m)   Wt 90.5 kg (199 lb 9.6 oz)   SpO2 96%  "  BMI 28.64 kg/m²        Physical Exam  Constitutional:       General: He is not in acute distress.     Appearance: He is not ill-appearing.   Cardiovascular:      Rate and Rhythm: Normal rate.   Pulmonary:      Effort: Pulmonary effort is normal. No respiratory distress.   Neurological:      General: No focal deficit present.      Mental Status: He is alert and oriented to person, place, and time.      Cranial Nerves: No cranial nerve deficit.      Motor: No weakness.      Gait: Gait normal.   Psychiatric:         Mood and Affect: Mood normal.         Thought Content: Thought content normal.         Judgment: Judgment normal.           Labs in chart were reviewed.      Assessment/Plan:         Diagnoses and all orders for this visit:    Current mild episode of major depressive disorder without prior episode (HCC)    Anxiety  -     ALPRAZolam (XANAX) 0.25 mg tablet; Take 1 tablet (0.25 mg total) by mouth daily at bedtime as needed for anxiety          Both stable  Cont meds  Declined shingles vaccine   Rto in 6 m for annual PE                   Isabel Bhandari MD

## 2024-05-10 DIAGNOSIS — F41.9 ANXIETY: ICD-10-CM

## 2024-05-12 RX ORDER — BUSPIRONE HYDROCHLORIDE 15 MG/1
15 TABLET ORAL 2 TIMES DAILY
Qty: 180 TABLET | Refills: 0 | Status: SHIPPED | OUTPATIENT
Start: 2024-05-12 | End: 2024-11-08

## 2024-05-15 DIAGNOSIS — F32.0 CURRENT MILD EPISODE OF MAJOR DEPRESSIVE DISORDER WITHOUT PRIOR EPISODE (HCC): ICD-10-CM

## 2024-05-15 RX ORDER — DULOXETIN HYDROCHLORIDE 30 MG/1
30 CAPSULE, DELAYED RELEASE ORAL DAILY
Qty: 90 CAPSULE | Refills: 1 | Status: SHIPPED | OUTPATIENT
Start: 2024-05-15

## 2024-08-25 DIAGNOSIS — F32.0 CURRENT MILD EPISODE OF MAJOR DEPRESSIVE DISORDER WITHOUT PRIOR EPISODE (HCC): ICD-10-CM

## 2024-08-26 RX ORDER — DULOXETIN HYDROCHLORIDE 30 MG/1
30 CAPSULE, DELAYED RELEASE ORAL DAILY
Qty: 90 CAPSULE | Refills: 1 | Status: SHIPPED | OUTPATIENT
Start: 2024-08-26

## 2024-10-05 DIAGNOSIS — F41.9 ANXIETY: ICD-10-CM

## 2024-10-07 RX ORDER — BUSPIRONE HYDROCHLORIDE 15 MG/1
15 TABLET ORAL 2 TIMES DAILY
Qty: 180 TABLET | Refills: 1 | Status: SHIPPED | OUTPATIENT
Start: 2024-10-07 | End: 2025-04-05

## 2024-10-08 RX ORDER — ALPRAZOLAM 0.25 MG
0.25 TABLET ORAL
Qty: 30 TABLET | Refills: 1 | Status: SHIPPED | OUTPATIENT
Start: 2024-10-08

## 2024-10-23 DIAGNOSIS — F32.0 CURRENT MILD EPISODE OF MAJOR DEPRESSIVE DISORDER WITHOUT PRIOR EPISODE (HCC): ICD-10-CM

## 2024-10-23 RX ORDER — ARIPIPRAZOLE 5 MG/1
5 TABLET ORAL
Qty: 90 TABLET | Refills: 1 | Status: SHIPPED | OUTPATIENT
Start: 2024-10-23

## 2024-10-28 ENCOUNTER — OFFICE VISIT (OUTPATIENT)
Dept: FAMILY MEDICINE CLINIC | Facility: CLINIC | Age: 54
End: 2024-10-28
Payer: COMMERCIAL

## 2024-10-28 VITALS
DIASTOLIC BLOOD PRESSURE: 80 MMHG | HEART RATE: 83 BPM | OXYGEN SATURATION: 96 % | SYSTOLIC BLOOD PRESSURE: 100 MMHG | HEIGHT: 70 IN | TEMPERATURE: 98.3 F | BODY MASS INDEX: 30.03 KG/M2 | RESPIRATION RATE: 18 BRPM | WEIGHT: 209.8 LBS

## 2024-10-28 DIAGNOSIS — F41.9 ANXIETY: ICD-10-CM

## 2024-10-28 DIAGNOSIS — Z23 ENCOUNTER FOR IMMUNIZATION: ICD-10-CM

## 2024-10-28 DIAGNOSIS — Z00.00 ANNUAL PHYSICAL EXAM: Primary | ICD-10-CM

## 2024-10-28 DIAGNOSIS — F32.0 CURRENT MILD EPISODE OF MAJOR DEPRESSIVE DISORDER WITHOUT PRIOR EPISODE (HCC): ICD-10-CM

## 2024-10-28 PROCEDURE — 90471 IMMUNIZATION ADMIN: CPT | Performed by: FAMILY MEDICINE

## 2024-10-28 PROCEDURE — 99396 PREV VISIT EST AGE 40-64: CPT | Performed by: FAMILY MEDICINE

## 2024-10-28 PROCEDURE — 90673 RIV3 VACCINE NO PRESERV IM: CPT | Performed by: FAMILY MEDICINE

## 2024-10-28 NOTE — PROGRESS NOTES
Chief Complaint   Patient presents with    Follow-up    Physical Exam        Patient ID: Yonatan Garcia is a 54 y.o. male.    HPI  Pt is seeing for annual PE - has Depression and Anxiety - has problems with sleep and feeling tired all the time  -  Depression is controlled     The following portions of the patient's history were reviewed and updated as appropriate: allergies, current medications, past family history, past medical history, past social history, past surgical history and problem list.    Review of Systems   Constitutional:  Positive for fatigue. Negative for activity change, appetite change, chills, fever and unexpected weight change.   HENT:  Negative for congestion, ear discharge, ear pain, hearing loss, rhinorrhea, sinus pressure, sore throat and trouble swallowing.    Eyes: Negative.    Respiratory: Negative.     Cardiovascular: Negative.    Gastrointestinal: Negative.    Endocrine: Negative.    Genitourinary: Negative.    Musculoskeletal: Negative.    Skin: Negative.    Neurological:  Negative for dizziness, weakness, light-headedness and numbness.   Hematological: Negative.    Psychiatric/Behavioral:  Positive for sleep disturbance. Negative for decreased concentration, dysphoric mood, self-injury and suicidal ideas. The patient is not nervous/anxious.        Current Outpatient Medications   Medication Sig Dispense Refill    ALPRAZolam (XANAX) 0.25 mg tablet Take 1 tablet (0.25 mg total) by mouth daily at bedtime as needed for anxiety 30 tablet 1    ARIPiprazole (ABILIFY) 5 mg tablet Take 1 tablet (5 mg total) by mouth daily at bedtime 90 tablet 1    busPIRone (BUSPAR) 15 mg tablet Take 1 tablet (15 mg total) by mouth 2 (two) times a day 180 tablet 1    DULoxetine (CYMBALTA) 30 mg delayed release capsule Take 1 capsule (30 mg total) by mouth daily 90 capsule 1     No current facility-administered medications for this visit.       Objective:    /80 (BP Location: Left arm, Patient Position:  "Sitting, Cuff Size: Large)   Pulse 83   Temp 98.3 °F (36.8 °C) (Temporal)   Resp 18   Ht 5' 10\" (1.778 m)   Wt 95.2 kg (209 lb 12.8 oz)   SpO2 96%   BMI 30.10 kg/m²        Physical Exam  Constitutional:       General: He is not in acute distress.     Appearance: Normal appearance. He is well-developed. He is not ill-appearing.   HENT:      Head: Normocephalic and atraumatic.      Right Ear: Hearing, tympanic membrane, ear canal and external ear normal.      Left Ear: Hearing, tympanic membrane, ear canal and external ear normal.      Nose: No congestion or rhinorrhea.      Mouth/Throat:      Pharynx: No oropharyngeal exudate or posterior oropharyngeal erythema.   Eyes:      Extraocular Movements: Extraocular movements intact.      Conjunctiva/sclera: Conjunctivae normal.   Neck:      Thyroid: No thyroid mass or thyromegaly.      Vascular: No JVD.   Cardiovascular:      Rate and Rhythm: Normal rate and regular rhythm.      Heart sounds: Normal heart sounds. No murmur heard.     No gallop.   Pulmonary:      Effort: No respiratory distress.      Breath sounds: Normal breath sounds. No wheezing, rhonchi or rales.   Abdominal:      Palpations: Abdomen is soft.      Tenderness: There is no abdominal tenderness. There is no right CVA tenderness or left CVA tenderness.   Musculoskeletal:         General: No swelling or tenderness.      Cervical back: Normal range of motion and neck supple. No rigidity or tenderness.      Right lower leg: No edema.      Left lower leg: No edema.   Lymphadenopathy:      Cervical: No cervical adenopathy.   Skin:     Coloration: Skin is not pale.      Findings: No rash.   Neurological:      Mental Status: He is alert and oriented to person, place, and time.      Cranial Nerves: No cranial nerve deficit.      Motor: No weakness.      Gait: Gait normal.   Psychiatric:         Mood and Affect: Mood normal.         Behavior: Behavior normal.         Thought Content: Thought content normal.   "       Judgment: Judgment normal.                 Assessment/Plan:         Diagnoses and all orders for this visit:    Annual physical exam  -     CBC; Future  -     Comprehensive metabolic panel; Future  -     Lipid panel; Future  -     TSH, 3rd generation; Future  -     Hemoglobin A1C; Future  -     CBC  -     Comprehensive metabolic panel  -     Lipid panel  -     TSH, 3rd generation  -     Hemoglobin A1C    Encounter for immunization  -     influenza vaccine, recombinant, PF, 0.5 mL IM (Flublok)    Current mild episode of major depressive disorder without prior episode (HCC)    Anxiety      Will try Melatonin 10 mg daily    Rto in 6 m       Depression Screening and Follow-up Plan: Patient's depression screening was positive with a PHQ-9 score of 11. Patient with underlying depression and was advised to continue current medications as prescribed.                       Isabel Bhandari MD

## 2024-11-03 LAB
ALBUMIN SERPL-MCNC: 4.2 G/DL (ref 3.8–4.9)
ALP SERPL-CCNC: 82 IU/L (ref 44–121)
ALT SERPL-CCNC: 19 IU/L (ref 0–44)
AST SERPL-CCNC: 19 IU/L (ref 0–40)
BILIRUB SERPL-MCNC: 0.6 MG/DL (ref 0–1.2)
BUN SERPL-MCNC: 19 MG/DL (ref 6–24)
BUN/CREAT SERPL: 16 (ref 9–20)
CALCIUM SERPL-MCNC: 9.6 MG/DL (ref 8.7–10.2)
CHLORIDE SERPL-SCNC: 101 MMOL/L (ref 96–106)
CHOLEST SERPL-MCNC: 227 MG/DL (ref 100–199)
CHOLEST/HDLC SERPL: 4 RATIO (ref 0–5)
CO2 SERPL-SCNC: 24 MMOL/L (ref 20–29)
CREAT SERPL-MCNC: 1.17 MG/DL (ref 0.76–1.27)
EGFR: 74 ML/MIN/1.73
ERYTHROCYTE [DISTWIDTH] IN BLOOD BY AUTOMATED COUNT: 12.4 % (ref 11.6–15.4)
EST. AVERAGE GLUCOSE BLD GHB EST-MCNC: 105 MG/DL
GLOBULIN SER-MCNC: 2.4 G/DL (ref 1.5–4.5)
GLUCOSE SERPL-MCNC: 107 MG/DL (ref 70–99)
HBA1C MFR BLD: 5.3 % (ref 4.8–5.6)
HCT VFR BLD AUTO: 46.7 % (ref 37.5–51)
HDLC SERPL-MCNC: 57 MG/DL
HGB BLD-MCNC: 15.5 G/DL (ref 13–17.7)
LDLC SERPL CALC-MCNC: 152 MG/DL (ref 0–99)
MCH RBC QN AUTO: 31.1 PG (ref 26.6–33)
MCHC RBC AUTO-ENTMCNC: 33.2 G/DL (ref 31.5–35.7)
MCV RBC AUTO: 94 FL (ref 79–97)
MICRODELETION SYND BLD/T FISH: NORMAL
PLATELET # BLD AUTO: 185 X10E3/UL (ref 150–450)
POTASSIUM SERPL-SCNC: 4.5 MMOL/L (ref 3.5–5.2)
PROT SERPL-MCNC: 6.6 G/DL (ref 6–8.5)
RBC # BLD AUTO: 4.98 X10E6/UL (ref 4.14–5.8)
SL AMB VLDL CHOLESTEROL CALC: 18 MG/DL (ref 5–40)
SODIUM SERPL-SCNC: 143 MMOL/L (ref 134–144)
TRIGL SERPL-MCNC: 101 MG/DL (ref 0–149)
TSH SERPL DL<=0.005 MIU/L-ACNC: 0.77 UIU/ML (ref 0.45–4.5)
WBC # BLD AUTO: 5.3 X10E3/UL (ref 3.4–10.8)

## 2024-11-19 DIAGNOSIS — F32.0 CURRENT MILD EPISODE OF MAJOR DEPRESSIVE DISORDER WITHOUT PRIOR EPISODE (HCC): ICD-10-CM

## 2024-11-20 RX ORDER — ARIPIPRAZOLE 5 MG/1
5 TABLET ORAL
Qty: 90 TABLET | Refills: 1 | Status: SHIPPED | OUTPATIENT
Start: 2024-11-20

## 2024-11-23 DIAGNOSIS — F32.0 CURRENT MILD EPISODE OF MAJOR DEPRESSIVE DISORDER WITHOUT PRIOR EPISODE (HCC): ICD-10-CM

## 2024-11-23 RX ORDER — DULOXETIN HYDROCHLORIDE 30 MG/1
30 CAPSULE, DELAYED RELEASE ORAL DAILY
Qty: 90 CAPSULE | Refills: 1 | Status: SHIPPED | OUTPATIENT
Start: 2024-11-23

## 2025-01-02 NOTE — PROGRESS NOTES
Ash I will add aloxi to the referral. Neulasta is already added to the referral. I will send  Team a message about the Aloxi and to review and approval this patient chemo treatment referral. Thank you    UROLOGY OUTPATIENT CONSULT NOTE     Name: Kendy Gore  : 1970  MRN: [id number]  Date of Service: 23      CHIEF COMPLAINT   Requests sterilization    History of Present Illness:     Kendy Gore  is a 48 y.o. male presenting for consideration of elective sterilization by vasectomy. The patient has 2 children who are healthy. He and his significant other have decided they are certain they want no further children. His wife is meghan-menopausal and they do not want to risk pregnancy. They have discussed this at length. Reports having a hydrocele repair about 23 years ago. Denies any additional scrotal or inguinal surgeries. PAST MEDICAL HISTORY:     Past Medical History:   Diagnosis Date    Anxiety     Back pain     Headache     Neck pain     Vertigo        PAST SURGICAL HISTORY:     Past Surgical History:   Procedure Laterality Date    APPENDECTOMY      CERVICAL FUSION N/A     HYDROCELE EXCISION / REPAIR      CA COLONOSCOPY FLX DX W/COLLJ SPEC WHEN PFRMD N/A 2018    Procedure: COLONOSCOPY;  Surgeon: Fabián Vazquez MD;  Location: [address] [place] [address] [address] GI LAB; Service: Gastroenterology    ROTATOR CUFF REPAIR Right     SHOULDER SURGERY Left        CURRENT MEDICATIONS:     Current Outpatient Medications   Medication Sig Dispense Refill    ALPRAZolam (XANAX) 0.25 mg tablet Take 1 tablet (0.25 mg total) by mouth daily as needed for anxiety 30 tablet 0    ARIPiprazole (ABILIFY) 5 mg tablet Take 1 tablet (5 mg total) by mouth daily 90 tablet 0    busPIRone (BUSPAR) 15 mg tablet Take 1 tablet (15 mg total) by mouth 2 (two) times a day 60 tablet 5    DULoxetine (CYMBALTA) 30 mg delayed release capsule Take 1 capsule (30 mg total) by mouth daily 90 capsule 1     No current facility-administered medications for this visit.        ALLERGIES:   No Known Allergies    SOCIAL HISTORY:     Social History     Socioeconomic History    Marital status: /Civil Union     Spouse name: None    Number of children: None Years of education: None    Highest education level: None   Occupational History    None   Tobacco Use    Smoking status: Former    Smokeless tobacco: Never   Vaping Use    Vaping Use: Never used   Substance and Sexual Activity    Alcohol use: No    Drug use: No    Sexual activity: Yes   Other Topics Concern    None   Social History Narrative    None     Social Determinants of Health     Financial Resource Strain: Not on file   Food Insecurity: Not on file   Transportation Needs: Not on file   Physical Activity: Not on file   Stress: Not on file   Social Connections: Not on file   Intimate Partner Violence: Not on file   Housing Stability: Not on file       FAMILY HISTORY:     Family History   Problem Relation Age of Onset    Breast cancer Mother     Diabetes Father     Hypertension Father     No Known Problems Sister     No Known Problems Brother     No Known Problems Daughter     No Known Problems Daughter        REVIEW OF SYSTEMS:     Pertinent +/- in HPI      PHYSICAL EXAM:     /82 (BP Location: Left arm, Patient Position: Sitting, Cuff Size: Adult)   Pulse 67   Ht 5' 10" (1.778 m)   Wt 93.4 kg (206 lb)   SpO2 99%   BMI 29.56 kg/m²     General:  Healthy appearing male in no acute distress. Head:  Normocephalic, atraumatic. Eyes: no scleral icterus  ENMT: Nares patent, moist mucous membranes  Cardiovascular:  Regular rate  Respiratory:  Patient has unlabored respirations. Abdomen:  Abdomen nondistended  Musculoskeletal: Normal gait  Neurological: Grossly intact  Psych: Normal affect  Genitourinary: normal phallus, testes are normal to palpation bilaterally, left vas is easily palpated, right vas is difficult to palpate due to thickened cord and there appears to be a recurrent, albeit small hydrocele. The testes are normal to palpation. ASSESSMENT:     48 y.o. male who desires vasectomy. We discussed the procedure of vasectomy in detail.    We discussed small risk (<1%) of hematoma, wound infection or orchalgia/post-vasectomy pain syndrome. We discussed that he should consider his vasectomy permanent sterilization. He affirmed that he has no desire for any further children ever. We discussed that sperm granulomas- small benign cystic masses- can form in testicular ends of vas and proximal epididymis. We discussed that he needs to wait a few to several months for his semen analysis to become free of sperm before he may stop using a reliable form of birth control. I informed the patient that unprotected intercourse prior to achieving a sperm-free semen analysis is the most common cause of unwanted pregnancy after vasectomy. We discussed safe sex practices and that vasectomy does not protect against sexually transmitted diseases. I discussed that he may be at higher than typical risk for right testicular atrophy since he has had surgery on the right hemiscrotum in the past.  I recommend that we do his procedure under anesthesia since the right vas deferens is difficult to palpate and he has had prior scrotal surgery. The patient expressed understanding.       PLAN:     Will schedule for vasectomy in the near future  Consent obtained today in office    Brody Oro MD  [facility]

## 2025-02-24 ENCOUNTER — OFFICE VISIT (OUTPATIENT)
Dept: FAMILY MEDICINE CLINIC | Facility: CLINIC | Age: 55
End: 2025-02-24
Payer: COMMERCIAL

## 2025-02-24 VITALS
RESPIRATION RATE: 18 BRPM | HEART RATE: 97 BPM | DIASTOLIC BLOOD PRESSURE: 80 MMHG | OXYGEN SATURATION: 96 % | TEMPERATURE: 97.6 F | HEIGHT: 70 IN | WEIGHT: 202 LBS | SYSTOLIC BLOOD PRESSURE: 130 MMHG | BODY MASS INDEX: 28.92 KG/M2

## 2025-02-24 DIAGNOSIS — F32.2 SEVERE MAJOR DEPRESSIVE DISORDER (HCC): ICD-10-CM

## 2025-02-24 DIAGNOSIS — J01.00 ACUTE NON-RECURRENT MAXILLARY SINUSITIS: Primary | ICD-10-CM

## 2025-02-24 PROCEDURE — 99214 OFFICE O/P EST MOD 30 MIN: CPT | Performed by: STUDENT IN AN ORGANIZED HEALTH CARE EDUCATION/TRAINING PROGRAM

## 2025-02-24 RX ORDER — METHYLPREDNISOLONE 4 MG/1
TABLET ORAL
Qty: 21 EACH | Refills: 0 | Status: SHIPPED | OUTPATIENT
Start: 2025-02-24

## 2025-02-24 NOTE — PROGRESS NOTES
"Name: Yonatan Garcia      : 1970      MRN: [id number]  Encounter Provider: Gilbert Lantigua DO  Encounter Date: 2025   Encounter department: [facility]  :  Assessment & Plan  Acute non-recurrent maxillary sinusitis  Symptoms consistent with maxillary sinusitis, recommend Augmentin antibiotic therapy with steroid taper, if symptoms worsen or not improving reevaluation recommended.  Orders:  •  amoxicillin-clavulanate (AUGMENTIN) 875-125 mg per tablet; Take 1 tablet by mouth every 12 (twelve) hours for 7 days  •  methylPREDNISolone 4 MG tablet therapy pack; Use as directed on package    Severe major depressive disorder (HCC)  Stable, continue medical therapy, follow-up with PCP.              History of Present Illness   Acute care visit, upper respiratory tract infection symptoms.      Review of Systems   Constitutional:  Negative for chills and fever.   HENT:  Positive for congestion and postnasal drip. Negative for ear pain and sore throat.    Eyes:  Negative for pain and visual disturbance.   Respiratory:  Negative for cough and shortness of breath.    Cardiovascular:  Negative for chest pain and palpitations.   Gastrointestinal:  Negative for abdominal pain and vomiting.   Genitourinary:  Negative for dysuria and hematuria.   Musculoskeletal:  Negative for arthralgias and back pain.   Skin:  Negative for color change and rash.   Neurological:  Negative for dizziness, seizures, syncope and headaches.   Psychiatric/Behavioral:  Negative for agitation, behavioral problems and confusion.    All other systems reviewed and are negative.      Objective   /80 (BP Location: Left arm, Patient Position: Sitting, Cuff Size: Standard)   Pulse 97   Temp 97.6 °F (36.4 °C) (Temporal)   Resp 18   Ht 5' 10\" (1.778 m)   Wt 91.6 kg (202 lb)   SpO2 96%   BMI 28.98 kg/m²      Physical Exam  Vitals and nursing note reviewed.   Constitutional:       General: He is not in acute " distress.     Appearance: He is well-developed.   HENT:      Head: Normocephalic and atraumatic.      Nose: Congestion and rhinorrhea present.   Eyes:      General: No scleral icterus.     Conjunctiva/sclera: Conjunctivae normal.   Cardiovascular:      Rate and Rhythm: Normal rate and regular rhythm.      Pulses: Normal pulses.      Heart sounds: No murmur heard.  Pulmonary:      Effort: Pulmonary effort is normal. No respiratory distress.      Breath sounds: Normal breath sounds.   Abdominal:      General: Bowel sounds are normal. There is no distension.      Palpations: Abdomen is soft.      Tenderness: There is no abdominal tenderness.   Musculoskeletal:         General: No swelling. Normal range of motion.      Cervical back: Neck supple.   Skin:     General: Skin is warm and dry.      Capillary Refill: Capillary refill takes less than 2 seconds.   Neurological:      General: No focal deficit present.      Mental Status: He is alert and oriented to person, place, and time. Mental status is at baseline.   Psychiatric:         Mood and Affect: Mood normal.         Behavior: Behavior normal.

## 2025-03-27 DIAGNOSIS — F41.9 ANXIETY: ICD-10-CM

## 2025-03-28 RX ORDER — ALPRAZOLAM 0.25 MG
0.25 TABLET ORAL
Qty: 30 TABLET | Refills: 0 | Status: SHIPPED | OUTPATIENT
Start: 2025-03-28

## 2025-03-30 DIAGNOSIS — F41.9 ANXIETY: ICD-10-CM

## 2025-03-30 RX ORDER — BUSPIRONE HYDROCHLORIDE 15 MG/1
15 TABLET ORAL 2 TIMES DAILY
Qty: 180 TABLET | Refills: 1 | Status: SHIPPED | OUTPATIENT
Start: 2025-03-30

## 2025-04-29 ENCOUNTER — OFFICE VISIT (OUTPATIENT)
Dept: FAMILY MEDICINE CLINIC | Facility: CLINIC | Age: 55
End: 2025-04-29
Payer: COMMERCIAL

## 2025-04-29 VITALS
RESPIRATION RATE: 16 BRPM | SYSTOLIC BLOOD PRESSURE: 100 MMHG | DIASTOLIC BLOOD PRESSURE: 74 MMHG | BODY MASS INDEX: 27.41 KG/M2 | TEMPERATURE: 97.8 F | OXYGEN SATURATION: 95 % | WEIGHT: 195.8 LBS | HEART RATE: 80 BPM | HEIGHT: 71 IN

## 2025-04-29 DIAGNOSIS — F41.9 ANXIETY: Primary | ICD-10-CM

## 2025-04-29 DIAGNOSIS — F32.0 CURRENT MILD EPISODE OF MAJOR DEPRESSIVE DISORDER WITHOUT PRIOR EPISODE (HCC): ICD-10-CM

## 2025-04-29 DIAGNOSIS — Z00.00 ANNUAL PHYSICAL EXAM: Primary | ICD-10-CM

## 2025-04-29 PROCEDURE — 99213 OFFICE O/P EST LOW 20 MIN: CPT | Performed by: FAMILY MEDICINE

## 2025-04-29 NOTE — ASSESSMENT & PLAN NOTE
Stable  [facility] meds  Depression Screening Follow-up Plan: Patient's depression screening was positive with a PHQ-9 score of 6. Patient with underlying depression and was advised to continue current medications as prescribed.

## 2025-04-29 NOTE — PROGRESS NOTES
Name: Yonatan Garcia      : 1970      MRN: [id number]  Encounter Provider: Isabel Bhandari MD  Encounter Date: 2025   Encounter department: [facility]    Assessment & Plan  Anxiety  Stable  Cont meds       Current mild episode of major depressive disorder without prior episode (HCC)  Stable  Cont meds  Depression Screening Follow-up Plan: Patient's depression screening was positive with a PHQ-9 score of 6. Patient with underlying depression and was advised to continue current medications as prescribed.           Depression Screening and Follow-up Plan:   Patient with underlying depression and was advised to continue current medications as prescribed.         History of Present Illness     Pt is seeing for f/u Anxiety and Depression-  stable -  taking meds      Review of Systems   Constitutional: Negative.    Respiratory: Negative.     Cardiovascular: Negative.    Gastrointestinal: Negative.    Genitourinary: Negative.    Musculoskeletal: Negative.    Skin: Negative.    Neurological: Negative.    Psychiatric/Behavioral: Negative.       Past Medical History:   Diagnosis Date   • Anxiety    • Back pain     had PT   • Depression     Extreme high & lows   • Headache    • Neck pain     hx of cervical fusion   • Vertigo 2024     Past Surgical History:   Procedure Laterality Date   • APPENDECTOMY     • CERVICAL FUSION N/A     C 6,7 fusion   • HYDROCELE EXCISION / REPAIR     • CT COLONOSCOPY FLX DX W/COLLJ SPEC WHEN PFRMD N/A 2018    Procedure: COLONOSCOPY;  Surgeon: Mani Wade MD;  Location: [facility];  Service: Gastroenterology   • CT VASECTOMY UNI/BI SPX W/POSTOP SEMEN EXAMS Bilateral 2024    Procedure: VASECTOMY;  Surgeon: Anat Miranda MD;  Location: [facility];  Service: Urology   • ROTATOR CUFF REPAIR Right    • SHOULDER SURGERY Left     arthroscopy   • SPINE SURGERY       Family History   Problem Relation Age of Onset   • Breast  cancer Mother         Passed away from in    • Cancer Mother    • Diabetes Father         Type 2   • Hypertension Father    • No Known Problems Sister    • No Known Problems Brother    • No Known Problems Daughter    • No Known Problems Daughter      Social History     Tobacco Use   • Smoking status: Former     Current packs/day: 0.00     Average packs/day: 0.5 packs/day for 15.6 years (7.8 ttl pk-yrs)     Types: Cigarettes, Cigars     Start date: 1986     Quit date: 2002     Years since quittin.3   • Smokeless tobacco: Never   • Tobacco comments:     Quit over 5 years ago   Vaping Use   • Vaping status: Never Used   Substance and Sexual Activity   • Alcohol use: Yes     Alcohol/week: 2.0 standard drinks of alcohol     Types: 2 Cans of beer per week     Comment: socially   • Drug use: No   • Sexual activity: Yes     Partners: Female     Birth control/protection: Condom Male     Current Outpatient Medications on File Prior to Visit   Medication Sig   • ALPRAZolam (XANAX) 0.25 mg tablet Take 1 tablet (0.25 mg total) by mouth daily at bedtime as needed for anxiety   • ARIPiprazole (ABILIFY) 5 mg tablet Take 1 tablet (5 mg total) by mouth daily at bedtime   • busPIRone (BUSPAR) 15 mg tablet take 1 tablet by mouth twice a day   • DULoxetine (CYMBALTA) 30 mg delayed release capsule Take 1 capsule (30 mg total) by mouth daily   • [DISCONTINUED] ketorolac (TORADOL) 10 mg tablet Take 1 tablet (10 mg total) by mouth every 6 (six) hours as needed for moderate pain (headache)   • [DISCONTINUED] methylPREDNISolone 4 MG tablet therapy pack Use as directed on package     No Known Allergies  Immunization History   Administered Date(s) Administered   • COVID-19 MODERNA VACC 0.5 ML IM 2021   • COVID-19, unspecified 2021, 05/10/2021   • INFLUENZA 10/28/2018   • Influenza Recombinant Preservative Free Im 10/28/2024   • Influenza, injectable, quadrivalent, preservative free 0.5 mL 10/28/2018, 2019,  "10/27/2023   • Influenza, recombinant, quadrivalent,injectable, preservative free 11/06/2020, 09/27/2021, 12/22/2022   • Influenza, seasonal, injectable 11/18/2010   • Tdap 12/11/2012, 01/24/2023     Objective   /74 (BP Location: Left arm, Patient Position: Sitting, Cuff Size: Standard)   Pulse 80   Temp 97.8 °F (36.6 °C) (Temporal)   Resp 16   Ht 5' 11\" (1.803 m)   Wt 88.8 kg (195 lb 12.8 oz)   SpO2 95%   BMI 27.31 kg/m²     Physical Exam  Constitutional:       General: He is not in acute distress.     Appearance: He is not ill-appearing.   Cardiovascular:      Rate and Rhythm: Normal rate and regular rhythm.   Pulmonary:      Effort: Pulmonary effort is normal.   Neurological:      General: No focal deficit present.      Mental Status: He is alert and oriented to person, place, and time.      Cranial Nerves: No cranial nerve deficit.      Motor: No weakness.      Gait: Gait normal.   Psychiatric:         Mood and Affect: Mood normal.         Thought Content: Thought content normal.         Judgment: Judgment normal.         "

## 2025-05-17 DIAGNOSIS — F32.0 CURRENT MILD EPISODE OF MAJOR DEPRESSIVE DISORDER WITHOUT PRIOR EPISODE (HCC): ICD-10-CM

## 2025-05-19 RX ORDER — ARIPIPRAZOLE 5 MG/1
5 TABLET ORAL
Qty: 90 TABLET | Refills: 1 | Status: SHIPPED | OUTPATIENT
Start: 2025-05-19

## 2025-06-17 DIAGNOSIS — F41.9 ANXIETY: ICD-10-CM

## 2025-06-17 RX ORDER — BUSPIRONE HYDROCHLORIDE 15 MG/1
15 TABLET ORAL 2 TIMES DAILY
Qty: 180 TABLET | Refills: 0 | Status: SHIPPED | OUTPATIENT
Start: 2025-06-17

## 2025-06-17 RX ORDER — ALPRAZOLAM 0.25 MG
0.25 TABLET ORAL
Qty: 30 TABLET | Refills: 0 | Status: SHIPPED | OUTPATIENT
Start: 2025-06-17

## 2025-06-30 DIAGNOSIS — F32.0 CURRENT MILD EPISODE OF MAJOR DEPRESSIVE DISORDER WITHOUT PRIOR EPISODE (HCC): ICD-10-CM

## 2025-07-02 RX ORDER — ARIPIPRAZOLE 5 MG/1
5 TABLET ORAL
Qty: 90 TABLET | Refills: 0 | OUTPATIENT
Start: 2025-07-02

## 2025-08-07 ENCOUNTER — TELEPHONE (OUTPATIENT)
Dept: GASTROENTEROLOGY | Facility: AMBULARY SURGERY CENTER | Age: 55
End: 2025-08-07

## (undated) DEVICE — SOLIDIFIER FLUID WASTE CONTROL 1500ML

## (undated) DEVICE — SUT CHROMIC 4-0 SH-1 27 IN G181H

## (undated) DEVICE — AIRLIFE™  ADULT CUSHION NASAL CANNULA WITH 7 FOOT (2.1 M) CRUSH-RESISTANT OXYGEN TUBING, AND U/CONNECT-IT ADAPTER: Brand: AIRLIFE™

## (undated) DEVICE — BRUSH ENDO CLEANING DBL-HEADER

## (undated) DEVICE — ASTOUND STANDARD SURGICAL GOWN, XL: Brand: CONVERTORS

## (undated) DEVICE — "MH-443 SUCTION VALVE F/EVIS140 EVIS160": Brand: SUCTION VALVE

## (undated) DEVICE — DRAPE UTILITY

## (undated) DEVICE — PREMIUM DRY TRAY LF: Brand: MEDLINE INDUSTRIES, INC.

## (undated) DEVICE — TOWEL SET X-RAY

## (undated) DEVICE — ELECTRODE NEEDLE MOD E-Z CLEAN 2.75IN 7CM -0013M

## (undated) DEVICE — SUSPENSORY X-LARGE

## (undated) DEVICE — SUT CHROMIC 3-0 SH 27 IN G122H

## (undated) DEVICE — BASIC DOUBLE BASIN 2-LF: Brand: MEDLINE INDUSTRIES, INC.

## (undated) DEVICE — DISPOSABLE BIOPSY VALVE MAJ-1555: Brand: SINGLE USE BIOPSY VALVE (STERILE)

## (undated) DEVICE — LUBRICANT SURGILUBE TUBE 4 OZ  FLIP TOP

## (undated) DEVICE — LIGACLIP APPLIER SMALL

## (undated) DEVICE — 1200CC GUARDIAN II: Brand: GUARDIAN

## (undated) DEVICE — CHLORHEXIDINE 4PCT 4 OZ

## (undated) DEVICE — GAUZE SPONGES,16 PLY: Brand: CURITY

## (undated) DEVICE — MEDI-VAC YANKAUER SUCTION HANDLE: Brand: CARDINAL HEALTH

## (undated) DEVICE — "MH-438 A/W VLVE F/140 EVIS-140": Brand: AIR/WATER VALVE

## (undated) DEVICE — TUBING AUX CHANNEL

## (undated) DEVICE — SUPER SPONGES,MEDIUM: Brand: KERLIX

## (undated) DEVICE — GLOVE INDICATOR PI UNDERGLOVE SZ 7 BLUE

## (undated) DEVICE — NEPTUNE E-SEP SMOKE EVACUATION PENCIL, COATED, 70MM BLADE, PUSH BUTTON SWITCH: Brand: NEPTUNE E-SEP

## (undated) DEVICE — GLOVE EXAM NON-STRL NTRL PLUS LRG PF

## (undated) DEVICE — "MAJ-901 WATER CONTAINER SET CV-160/140": Brand: WATER CONTAINER

## (undated) DEVICE — MINOR PROCEDURE DRAPE: Brand: CONVERTORS

## (undated) DEVICE — GLOVE SRG BIOGEL 7

## (undated) DEVICE — PACK GENERAL LF

## (undated) DEVICE — TUBING BUBBLE CLEAR 5MM X 100 FT NS